# Patient Record
Sex: FEMALE | Race: WHITE | NOT HISPANIC OR LATINO | Employment: OTHER | URBAN - METROPOLITAN AREA
[De-identification: names, ages, dates, MRNs, and addresses within clinical notes are randomized per-mention and may not be internally consistent; named-entity substitution may affect disease eponyms.]

---

## 2018-07-03 ENCOUNTER — OFFICE VISIT (OUTPATIENT)
Dept: URGENT CARE | Facility: CLINIC | Age: 68
End: 2018-07-03
Payer: MEDICARE

## 2018-07-03 VITALS
OXYGEN SATURATION: 93 % | WEIGHT: 240 LBS | SYSTOLIC BLOOD PRESSURE: 120 MMHG | RESPIRATION RATE: 30 BRPM | HEART RATE: 104 BPM | HEIGHT: 66 IN | BODY MASS INDEX: 38.57 KG/M2 | DIASTOLIC BLOOD PRESSURE: 60 MMHG | TEMPERATURE: 99 F

## 2018-07-03 DIAGNOSIS — R09.02 HYPOXIA: ICD-10-CM

## 2018-07-03 DIAGNOSIS — R06.02 SHORTNESS OF BREATH: Primary | ICD-10-CM

## 2018-07-03 PROCEDURE — 94640 AIRWAY INHALATION TREATMENT: CPT | Mod: 59,S$GLB,, | Performed by: FAMILY MEDICINE

## 2018-07-03 PROCEDURE — 99214 OFFICE O/P EST MOD 30 MIN: CPT | Mod: 25,S$GLB,, | Performed by: FAMILY MEDICINE

## 2018-07-03 RX ORDER — ERGOCALCIFEROL 1.25 MG/1
50000 CAPSULE ORAL
COMMUNITY

## 2018-07-03 RX ORDER — DOCUSATE SODIUM 100 MG/1
100 CAPSULE, LIQUID FILLED ORAL 2 TIMES DAILY
COMMUNITY

## 2018-07-03 RX ORDER — LORATADINE 10 MG/1
10 TABLET ORAL DAILY
COMMUNITY

## 2018-07-03 RX ORDER — AMLODIPINE BESYLATE 10 MG/1
10 TABLET ORAL DAILY
COMMUNITY
End: 2019-09-24 | Stop reason: SDUPTHER

## 2018-07-03 RX ORDER — ACETAMINOPHEN 500 MG
500 TABLET ORAL EVERY 6 HOURS PRN
COMMUNITY

## 2018-07-03 RX ORDER — ALBUTEROL SULFATE 0.63 MG/3ML
0.63 SOLUTION RESPIRATORY (INHALATION) EVERY 6 HOURS PRN
Qty: 1 BOX | Refills: 0 | Status: SHIPPED | OUTPATIENT
Start: 2018-07-03 | End: 2022-01-01

## 2018-07-03 RX ORDER — ALBUTEROL SULFATE 0.83 MG/ML
2.5 SOLUTION RESPIRATORY (INHALATION)
Status: COMPLETED | OUTPATIENT
Start: 2018-07-03 | End: 2018-07-03

## 2018-07-03 RX ORDER — ANASTROZOLE 1 MG/1
1 TABLET ORAL DAILY
COMMUNITY

## 2018-07-03 RX ORDER — CALCIUM CARBONATE 500(1250)
1 TABLET ORAL DAILY
COMMUNITY
End: 2022-01-01

## 2018-07-03 RX ORDER — IPRATROPIUM BROMIDE 0.5 MG/2.5ML
0.5 SOLUTION RESPIRATORY (INHALATION)
Status: COMPLETED | OUTPATIENT
Start: 2018-07-03 | End: 2018-07-03

## 2018-07-03 RX ORDER — LISINOPRIL 40 MG/1
40 TABLET ORAL DAILY
COMMUNITY
End: 2020-03-20 | Stop reason: SDUPTHER

## 2018-07-03 RX ADMIN — IPRATROPIUM BROMIDE 0.5 MG: 0.5 SOLUTION RESPIRATORY (INHALATION) at 02:07

## 2018-07-03 RX ADMIN — ALBUTEROL SULFATE 2.5 MG: 0.83 SOLUTION RESPIRATORY (INHALATION) at 02:07

## 2018-07-03 NOTE — PATIENT INSTRUCTIONS

## 2018-07-03 NOTE — PROGRESS NOTES
"Subjective:       Patient ID: Stephanie Gonzalez is a 68 y.o. female.    Vitals:  height is 5' 6" (1.676 m) and weight is 108.9 kg (240 lb). Her oral temperature is 98.6 °F (37 °C). Her blood pressure is 120/60 and her pulse is 104. Her respiration is 30 (abnormal) and oxygen saturation is 93% (abnormal).     Chief Complaint: Cough    Hx of tobacco abuse and diagnosis of COPD per patient. Reports 20 pk/yr history. Recently seen by PCP and diagnosed with sinusitis      Cough   This is a new problem. The current episode started yesterday. The problem has been gradually improving. The problem occurs every few hours. The cough is productive of sputum. Associated symptoms include a fever, shortness of breath and wheezing. Pertinent negatives include no chest pain, chills, ear pain, eye redness, headaches, myalgias or sore throat. She has tried OTC cough suppressant for the symptoms.     Review of Systems   Constitution: Positive for fever. Negative for chills and malaise/fatigue.   HENT: Negative for congestion, ear pain, hoarse voice and sore throat.    Eyes: Negative for discharge and redness.   Cardiovascular: Negative for chest pain, dyspnea on exertion and leg swelling.   Respiratory: Positive for cough, shortness of breath, sputum production and wheezing.    Musculoskeletal: Negative for myalgias.   Gastrointestinal: Negative for abdominal pain and nausea.   Neurological: Negative for headaches.       Objective:      Physical Exam   Constitutional: She appears well-developed and well-nourished. She appears distressed.   HENT:   Head: Normocephalic and atraumatic.   Eyes: EOM are normal. Pupils are equal, round, and reactive to light.   Neck: Normal range of motion. Neck supple.   Cardiovascular: Normal rate and regular rhythm.    Pulmonary/Chest: She is in respiratory distress. She has wheezes (diffuse with poor airway movement).   Skin: There is cyanosis.   Nursing note and vitals reviewed.      Assessment:     "   1. Shortness of breath    2. Hypoxia        Plan:         Shortness of breath  -     X-Ray Chest PA And Lateral; Future; Expected date: 07/03/2018  -     albuterol (ACCUNEB) 0.63 mg/3 mL Nebu; Take 3 mLs (0.63 mg total) by nebulization every 6 (six) hours as needed. Rescue  Dispense: 1 Box; Refill: 0  -     ipratropium 0.02 % nebulizer solution 0.5 mg; Take 2.5 mLs (0.5 mg total) by nebulization one time.  -     albuterol nebulizer solution 2.5 mg; Take 3 mLs (2.5 mg total) by nebulization one time.    Hypoxia      Referred patient to the ER for evaluation of hypoxia. Patient and  declined and stated that they preferred to contact their family doctor and follow his advise. Stressed with patient that prolonged hypoxia is not compatible with life and would recommend ambulance transfer. Patient and  verbalized understanding but nonetheless declines ER evaluation

## 2018-07-06 ENCOUNTER — TELEPHONE (OUTPATIENT)
Dept: URGENT CARE | Facility: CLINIC | Age: 68
End: 2018-07-06

## 2018-08-23 LAB
CHOL/HDLC RATIO: 3
CHOLEST SERPL-MSCNC: 197 MG/DL (ref 0–200)
HDLC SERPL-MCNC: 77 MG/DL
LDLC SERPL CALC-MCNC: 103 MG/DL
NON HDL CHOL (CALC): 120
TRIGLYCERIDE (LIPID PAN): 109

## 2019-09-24 ENCOUNTER — OFFICE VISIT (OUTPATIENT)
Dept: FAMILY MEDICINE | Facility: CLINIC | Age: 69
End: 2019-09-24
Payer: MEDICARE

## 2019-09-24 VITALS
BODY MASS INDEX: 39.27 KG/M2 | SYSTOLIC BLOOD PRESSURE: 124 MMHG | DIASTOLIC BLOOD PRESSURE: 78 MMHG | OXYGEN SATURATION: 94 % | HEIGHT: 65 IN | TEMPERATURE: 99 F | WEIGHT: 235.69 LBS | RESPIRATION RATE: 18 BRPM | HEART RATE: 95 BPM

## 2019-09-24 DIAGNOSIS — D45 POLYCYTHEMIA VERA: ICD-10-CM

## 2019-09-24 DIAGNOSIS — I10 BENIGN ESSENTIAL HYPERTENSION: Primary | ICD-10-CM

## 2019-09-24 DIAGNOSIS — J44.9 CHRONIC OBSTRUCTIVE PULMONARY DISEASE, UNSPECIFIED COPD TYPE: ICD-10-CM

## 2019-09-24 DIAGNOSIS — Z85.3 HISTORY OF BREAST CANCER: ICD-10-CM

## 2019-09-24 DIAGNOSIS — M81.0 AGE-RELATED OSTEOPOROSIS WITHOUT CURRENT PATHOLOGICAL FRACTURE: ICD-10-CM

## 2019-09-24 DIAGNOSIS — J30.1 SEASONAL ALLERGIC RHINITIS DUE TO POLLEN: ICD-10-CM

## 2019-09-24 PROCEDURE — 99999 PR PBB SHADOW E&M-EST. PATIENT-LVL III: ICD-10-PCS | Mod: PBBFAC,HCNC,, | Performed by: INTERNAL MEDICINE

## 2019-09-24 PROCEDURE — 99499 UNLISTED E&M SERVICE: CPT | Mod: HCNC,S$GLB,, | Performed by: INTERNAL MEDICINE

## 2019-09-24 PROCEDURE — 99214 PR OFFICE/OUTPT VISIT, EST, LEVL IV, 30-39 MIN: ICD-10-PCS | Mod: HCNC,S$GLB,, | Performed by: INTERNAL MEDICINE

## 2019-09-24 PROCEDURE — 99214 OFFICE O/P EST MOD 30 MIN: CPT | Mod: HCNC,S$GLB,, | Performed by: INTERNAL MEDICINE

## 2019-09-24 PROCEDURE — 1101F PR PT FALLS ASSESS DOC 0-1 FALLS W/OUT INJ PAST YR: ICD-10-PCS | Mod: HCNC,CPTII,S$GLB, | Performed by: INTERNAL MEDICINE

## 2019-09-24 PROCEDURE — 99499 RISK ADDL DX/OHS AUDIT: ICD-10-PCS | Mod: HCNC,S$GLB,, | Performed by: INTERNAL MEDICINE

## 2019-09-24 PROCEDURE — 99999 PR PBB SHADOW E&M-EST. PATIENT-LVL III: CPT | Mod: PBBFAC,HCNC,, | Performed by: INTERNAL MEDICINE

## 2019-09-24 PROCEDURE — 1101F PT FALLS ASSESS-DOCD LE1/YR: CPT | Mod: HCNC,CPTII,S$GLB, | Performed by: INTERNAL MEDICINE

## 2019-09-24 RX ORDER — FLUTICASONE PROPIONATE AND SALMETEROL 250; 50 UG/1; UG/1
1 POWDER RESPIRATORY (INHALATION) 2 TIMES DAILY
Qty: 60 EACH | Refills: 11 | Status: SHIPPED | OUTPATIENT
Start: 2019-09-24 | End: 2020-09-28 | Stop reason: SDUPTHER

## 2019-09-24 RX ORDER — AMLODIPINE BESYLATE 10 MG/1
10 TABLET ORAL DAILY
Qty: 90 TABLET | Refills: 3 | Status: SHIPPED | OUTPATIENT
Start: 2019-09-24 | End: 2019-10-23 | Stop reason: SDUPTHER

## 2019-09-24 NOTE — PROGRESS NOTES
[facility] Note    Chief Complaint      Chief Complaint   Patient presents with    Follow-up     6 month follow up        History of Present Illness      Stephanie Gonzalez is a 69 y.o. female who presents today for   Chief Complaint   Patient presents with    Follow-up     6 month follow up    .  Patient comes to appointment for 6m f/u for chronic issues as discussed in detail below . She is due for full labs with this visit . Is seeing dr [facility] onc currently . Will need full labs with this visit . Wyatt thurston is uptodate with her mammogram for surveillance . She will be getting her flu vaccine next month with her  .     Problem List Items Addressed This Visit        Pulmonary    Chronic obstructive pulmonary disease    Overview     She is complaining of some sob and wheezing she needs to be placed on maint meds will start advair 250/50 bid she will call back with update             Cardiac/Vascular    Benign essential hypertension - Primary    Overview     bp well controlled on current regimen cont same             Oncology    Polycythemia vera    Overview     Dr lugo managing cont same          History of breast cancer    Overview     See above            Orthopedic    Age-related osteoporosis without current pathological fracture    Overview     cont prolia            Other    Seasonal allergic rhinitis due to pollen    Overview     stable on claritin cont same                   Past Medical History:  Past Medical History:   Diagnosis Date    Cancer 2014    Breast    Hypertension        Past Surgical History:  Past Surgical History:   Procedure Laterality Date    BREAST SURGERY      ELBOW SURGERY         Family History:  family history includes Diabetes in her father; Heart disease in her brother, brother, and father; Stroke in her mother.    Social History:  Social History     Socioeconomic History    Marital status:      Spouse name: Not on file    Number of  children: Not on file    Years of education: Not on file    Highest education level: Not on file   Occupational History    Not on file   Social Needs    Financial resource strain: Not on file    Food insecurity:     Worry: Not on file     Inability: Not on file    Transportation needs:     Medical: Not on file     Non-medical: Not on file   Tobacco Use    Smoking status: Former Smoker     Last attempt to quit: 7/3/2008     Years since quittin.2    Smokeless tobacco: Never Used   Substance and Sexual Activity    Alcohol use: Not on file    Drug use: Not on file    Sexual activity: Not on file   Lifestyle    Physical activity:     Days per week: Not on file     Minutes per session: Not on file    Stress: Not on file   Relationships    Social connections:     Talks on phone: Not on file     Gets together: Not on file     Attends [employer] service: Not on file     Active member of club or organization: Not on file     Attends meetings of clubs or organizations: Not on file     Relationship status: Not on file   Other Topics Concern    Not on file   Social History Narrative    Not on file       Review of Systems:   Review of Systems   Constitutional: Negative for fever and weight loss.   HENT: Negative for congestion, hearing loss and sore throat.    Eyes: Negative for blurred vision.   Respiratory: Positive for shortness of breath and wheezing. Negative for cough.    Cardiovascular: Negative for chest pain, palpitations, claudication and leg swelling.   Gastrointestinal: Negative for abdominal pain, constipation and heartburn.   Genitourinary: Negative for dysuria.   Musculoskeletal: Negative for back pain and myalgias.   Skin: Negative for rash.   Neurological: Negative for focal weakness and headaches.   Endo/Heme/Allergies: Does not bruise/bleed easily.   Psychiatric/Behavioral: Negative for depression and suicidal ideas. The patient is not nervous/anxious.          Medications:  Outpatient  Encounter Medications as of 9/24/2019   Medication Sig Dispense Refill    acetaminophen (TYLENOL) 500 MG tablet Take 500 mg by mouth every 6 (six) hours as needed for Pain.      amLODIPine (NORVASC) 10 MG tablet Take 10 mg by mouth once daily.      anastrozole (ARIMIDEX) 1 mg Tab Take 1 mg by mouth once daily.      calcium carbonate (OS-RAMÍREZ) 500 mg calcium (1,250 mg) tablet Take 1 tablet by mouth once daily.      docusate sodium (COLACE) 100 MG capsule Take 100 mg by mouth 2 (two) times daily.      ergocalciferol (VITAMIN D2) 50,000 unit Cap Take 50,000 Units by mouth every 7 days.      lisinopril (PRINIVIL,ZESTRIL) 40 MG tablet Take 40 mg by mouth once daily.      loratadine (CLARITIN) 10 mg tablet Take 10 mg by mouth once daily.      albuterol (ACCUNEB) 0.63 mg/3 mL Nebu Take 3 mLs (0.63 mg total) by nebulization every 6 (six) hours as needed. Rescue 1 Box 0    fluticasone-salmeterol diskus inhaler 250-50 mcg Inhale 1 puff into the lungs 2 (two) times daily. Controller 60 each 11     No facility-administered encounter medications on file as of 9/24/2019.         Allergies:  Review of patient's allergies indicates:  No Known Allergies      Physical Exam      Vitals:    09/24/19 1411   BP: 124/78   Pulse: 95   Resp: 18   Temp: 98.7 °F (37.1 °C)      Body mass index is 39.83 kg/m².    Physical Exam   Constitutional: She is oriented to person, place, and time. She appears well-developed and well-nourished.   HENT:   Mouth/Throat: Oropharynx is clear and moist.   Eyes: Pupils are equal, round, and reactive to light. EOM are normal.   Neck: Normal range of motion. No thyromegaly present.   Cardiovascular: Normal rate and normal heart sounds. Exam reveals no gallop and no friction rub.   No murmur heard.  Pulmonary/Chest: She has wheezes.   Abdominal: Soft. Bowel sounds are normal.   Musculoskeletal: Normal range of motion.   Lymphadenopathy:     She has no cervical adenopathy.   Neurological: She is alert and  oriented to person, place, and time. No cranial nerve deficit.   Skin: Skin is warm. No rash noted.   Psychiatric: She has a normal mood and affect. Her behavior is normal.        Laboratory:  CBC:  No results for input(s): WBC, RBC, HGB, HCT, PLT, MCV, MCH, MCHC in the last 2160 hours.  CMP:  No results for input(s): GLU, CALCIUM, ALBUMIN, PROT, NA, K, CO2, CL, BUN, ALKPHOS, ALT, AST, BILITOT in the last 2160 hours.    Invalid input(s): CREATININ  URINALYSIS:  No results for input(s): COLORU, CLARITYU, SPECGRAV, PHUR, PROTEINUA, GLUCOSEU, BILIRUBINCON, BLOODU, WBCU, RBCU, BACTERIA, MUCUS, NITRITE, LEUKOCYTESUR, UROBILINOGEN, HYALINECASTS in the last 2160 hours.   LIPIDS:  No results for input(s): TSH, HDL, CHOL, TRIG, LDLCALC, CHOLHDL, NONHDLCHOL, TOTALCHOLEST in the last 2160 hours.  TSH:  No results for input(s): TSH in the last 2160 hours.  A1C:  No results for input(s): HGBA1C in the last 2160 hours.    Radiology:        Assessment:     Stephanie Gonzalez is a 69 y.o.female with:    Benign essential hypertension  -     Comprehensive metabolic panel; Future; Expected date: 09/24/2019    History of breast cancer    Seasonal allergic rhinitis due to pollen    Chronic obstructive pulmonary disease, unspecified COPD type  -     fluticasone-salmeterol diskus inhaler 250-50 mcg; Inhale 1 puff into the lungs 2 (two) times daily. Controller  Dispense: 60 each; Refill: 11    Polycythemia vera  -     CBC auto differential; Future; Expected date: 09/24/2019    Age-related osteoporosis without current pathological fracture          Plan:     As above, continue current medications and maintain follow up with specialists.  Return to clinic in 6 months.    Frederick W Dantagnan [facility] [place]

## 2019-09-24 NOTE — PROGRESS NOTES
Patient, Stephanie Gonzalez (MRN #[id number]), presented with a recorded BMI of 39.83 kg/m^2 and a documented comorbidity(s):  - Hypertension  to which the severe obesity is a contributing factor. This is consistent with the definition of severe obesity (BMI 35.0-39.9) with comorbidity (ICD-10 E66.01, Z68.35). The patient's severe obesity was monitored, evaluated, addressed and/or treated. This addendum to the medical record is made on 09/24/2019.

## 2019-10-02 LAB
ALBUMIN: 4.2 GRAM/DL (ref 3.5–5)
ALP SERPL-CCNC: 79 UNIT/L (ref 38–126)
ALT SERPL W P-5'-P-CCNC: 11 UNIT/L (ref 7–56)
ANION GAP SERPL CALC-SCNC: 19 MEQ/L (ref 9–18)
AST SERPL-CCNC: 14 UNIT/L (ref 7–40)
BASOPHILS ABSOLUTE COUNT: 0 K/UL (ref 0–0.2)
BASOPHILS NFR BLD: 0.7 % (ref 0–2)
BILIRUB SERPL-MCNC: 0.8 MG/DL (ref 0–1.2)
BUN BLD-MCNC: 9 MG/DL (ref 7–21)
BUN/CREAT SERPL: 15 RATIO (ref 6–22)
CALC OSMOLALITY: 284 MOSM/KG (ref 275–295)
CALCIUM SERPL-MCNC: 9.3 MG/DL (ref 8.5–10.3)
CHLORIDE SERPL-SCNC: 99 MEQ/L (ref 98–107)
CO2 SERPL-SCNC: 29 MEQ/L (ref 21–31)
CREAT SERPL-MCNC: 0.6 MG/DL (ref 0.5–1)
DIFFERENTIAL TYPE: ABNORMAL
EOSINOPHIL NFR BLD: 2.8 % (ref 0–4)
EOSINOPHILS ABSOLUTE COUNT: 0.1 K/UL (ref 0–0.7)
ERYTHROCYTE [DISTWIDTH] IN BLOOD BY AUTOMATED COUNT: 13.8 GRAM/DL (ref 12–15.3)
GFR: 102.5 ML/MIN/1.73M2
GLUCOSE SERPL-MCNC: 97 MG/DL (ref 70–100)
HCT VFR BLD AUTO: 51.7 % (ref 37–47)
HGB BLD-MCNC: 17.6 GRAM/DL (ref 12–16)
LYMPHOCYTES %: 19.3 % (ref 15–45)
LYMPHOCYTES ABSOLUTE COUNT: 1 K/UL (ref 1–4.2)
MCH RBC QN AUTO: 35 PICOGRAM (ref 27–33)
MCHC RBC AUTO-ENTMCNC: 34.1 GRAM/DL (ref 32–36)
MCV RBC AUTO: 102.6 FEMTOLITER (ref 81–99)
MONOCYTES %: 10.7 % (ref 3–13)
MONOCYTES ABSOLUTE COUNT: 0.6 K/UL (ref 0.1–0.8)
NEUTROPHILS ABSOLUTE COUNT: 3.5 K/UL (ref 2.1–7.6)
NEUTROPHILS RELATIVE PERCENT: 66.5 % (ref 32–80)
PLATELET # BLD AUTO: 212 K/UL (ref 150–350)
PMV BLD AUTO: 8.8 FEMTOLITER (ref 7–10.2)
POTASSIUM SERPL-SCNC: 4.2 MEQ/L (ref 3.5–5)
RBC # BLD AUTO: 5.04 MIL/UL (ref 4.2–5.4)
SODIUM BLD-SCNC: 143 MEQ/L (ref 135–145)
TOTAL PROTEIN: 6.5 GRAM/DL (ref 6.3–8.2)
WBC # BLD AUTO: 5.3 K/UL (ref 4.5–11)

## 2019-10-03 ENCOUNTER — PATIENT MESSAGE (OUTPATIENT)
Dept: FAMILY MEDICINE | Facility: CLINIC | Age: 69
End: 2019-10-03

## 2019-10-23 DIAGNOSIS — I10 BENIGN ESSENTIAL HYPERTENSION: ICD-10-CM

## 2019-10-24 RX ORDER — AMLODIPINE BESYLATE 10 MG/1
10 TABLET ORAL DAILY
Qty: 90 TABLET | Refills: 3 | Status: SHIPPED | OUTPATIENT
Start: 2019-10-24 | End: 2020-09-29 | Stop reason: SDUPTHER

## 2020-03-12 ENCOUNTER — TELEPHONE (OUTPATIENT)
Dept: ADMINISTRATIVE | Facility: HOSPITAL | Age: 70
End: 2020-03-12

## 2020-03-20 RX ORDER — LISINOPRIL 40 MG/1
40 TABLET ORAL DAILY
Qty: 90 TABLET | Refills: 2 | Status: SHIPPED | OUTPATIENT
Start: 2020-03-20 | End: 2020-09-29 | Stop reason: SDUPTHER

## 2020-03-26 ENCOUNTER — PATIENT MESSAGE (OUTPATIENT)
Dept: FAMILY MEDICINE | Facility: CLINIC | Age: 70
End: 2020-03-26

## 2020-03-26 ENCOUNTER — OFFICE VISIT (OUTPATIENT)
Dept: FAMILY MEDICINE | Facility: CLINIC | Age: 70
End: 2020-03-26
Payer: MEDICARE

## 2020-03-26 DIAGNOSIS — I10 BENIGN ESSENTIAL HYPERTENSION: Primary | ICD-10-CM

## 2020-03-26 DIAGNOSIS — J30.1 SEASONAL ALLERGIC RHINITIS DUE TO POLLEN: ICD-10-CM

## 2020-03-26 DIAGNOSIS — D45 POLYCYTHEMIA VERA: ICD-10-CM

## 2020-03-26 DIAGNOSIS — Z85.3 HISTORY OF BREAST CANCER: ICD-10-CM

## 2020-03-26 DIAGNOSIS — M81.0 AGE-RELATED OSTEOPOROSIS WITHOUT CURRENT PATHOLOGICAL FRACTURE: ICD-10-CM

## 2020-03-26 DIAGNOSIS — J43.9 PULMONARY EMPHYSEMA, UNSPECIFIED EMPHYSEMA TYPE: ICD-10-CM

## 2020-03-26 PROCEDURE — 1101F PT FALLS ASSESS-DOCD LE1/YR: CPT | Mod: CPTII,95,, | Performed by: INTERNAL MEDICINE

## 2020-03-26 PROCEDURE — 99214 PR OFFICE/OUTPT VISIT, EST, LEVL IV, 30-39 MIN: ICD-10-PCS | Mod: 95,,, | Performed by: INTERNAL MEDICINE

## 2020-03-26 PROCEDURE — 99214 OFFICE O/P EST MOD 30 MIN: CPT | Mod: 95,,, | Performed by: INTERNAL MEDICINE

## 2020-03-26 PROCEDURE — 1101F PR PT FALLS ASSESS DOC 0-1 FALLS W/OUT INJ PAST YR: ICD-10-PCS | Mod: CPTII,95,, | Performed by: INTERNAL MEDICINE

## 2020-03-26 PROCEDURE — 99499 RISK ADDL DX/OHS AUDIT: ICD-10-PCS | Mod: 95,,, | Performed by: INTERNAL MEDICINE

## 2020-03-26 PROCEDURE — 1159F MED LIST DOCD IN RCRD: CPT | Mod: 95,,, | Performed by: INTERNAL MEDICINE

## 2020-03-26 PROCEDURE — 1159F PR MEDICATION LIST DOCUMENTED IN MEDICAL RECORD: ICD-10-PCS | Mod: 95,,, | Performed by: INTERNAL MEDICINE

## 2020-03-26 PROCEDURE — 99499 UNLISTED E&M SERVICE: CPT | Mod: 95,,, | Performed by: INTERNAL MEDICINE

## 2020-03-26 NOTE — PROGRESS NOTES
The patient location is: home   The chief complaint leading to consultation is: 6 m checkup for chronic issues and for refills .   Visit type: Virtual visit with synchronous audio and video  Total time spent with patient: 16 min  Each patient to whom he or she provides medical services by telemedicine is:  (1) informed of the relationship between the physician and patient and the respective role of any other health care provider with respect to management of the patient; and (2) notified that he or she may decline to receive medical services by telemedicine and may withdraw from such care at any time.      [facility] Note    Chief Complaint    6m checkup for my chronic issues and refills     History of Present Illness      Stephanie Gonzalez is a 70 y.o. female who presents today for No chief complaint on file.  .  Patient comes to appointment here for 6 m checkup for chronic issues as discussed in detail below . She is feeling well is complaint with all meds and  specialist f/u as below . She is uptodate with mammogram is considering cologuard has not had colonoscopy .     Problem List Items Addressed This Visit        Pulmonary    Chronic obstructive pulmonary disease    Overview     Is doing great wth advair 250/50 has rarely needed rescue cont current regimen             Cardiac/Vascular    Benign essential hypertension - Primary    Overview     bp well controlled on current regimen bp done march 4th 127/70 . Cont same regimen working well             Oncology    Polycythemia vera    Overview     Dr lugo managing cont same          History of breast cancer    Overview     Dr enriquez managing now is doing well on arimedex             Orthopedic    Age-related osteoporosis without current pathological fracture    Overview     cont prolia stable             Other    Seasonal allergic rhinitis due to pollen    Overview     stable on claritin cont same                   Past Medical  History:  Past Medical History:   Diagnosis Date    Cancer 2014    Breast    Hypertension        Past Surgical History:  Past Surgical History:   Procedure Laterality Date    BREAST SURGERY      ELBOW SURGERY         Family History:  family history includes Diabetes in her father; Heart disease in her brother, brother, and father; Stroke in her mother.    Social History:  Social History     Socioeconomic History    Marital status:      Spouse name: Not on file    Number of children: Not on file    Years of education: Not on file    Highest education level: Not on file   Occupational History    Not on file   Social Needs    Financial resource strain: Not hard at all    Food insecurity:     Worry: Never true     Inability: Never true    Transportation needs:     Medical: No     Non-medical: No   Tobacco Use    Smoking status: Former Smoker     Last attempt to quit: 7/3/2008     Years since quittin.7    Smokeless tobacco: Never Used   Substance and Sexual Activity    Alcohol use: Not on file    Drug use: Not on file    Sexual activity: Not on file   Lifestyle    Physical activity:     Days per week: Not on file     Minutes per session: Not on file    Stress: Not at all   Relationships    Social connections:     Talks on phone: More than three times a week     Gets together: Twice a week     Attends Nondenominational service: Not on file     Active member of club or organization: No     Attends meetings of clubs or organizations: Never     Relationship status:    Other Topics Concern    Not on file   Social History Narrative    Not on file       Review of Systems:   Review of Systems   HENT: Negative for hearing loss.    Eyes: Negative for discharge.   Respiratory: Negative for wheezing.    Cardiovascular: Negative for chest pain and palpitations.   Gastrointestinal: Negative for blood in stool, constipation, diarrhea and vomiting.   Genitourinary: Negative for dysuria and hematuria.    Musculoskeletal: Negative for neck pain.   Neurological: Negative for weakness and headaches.   Endo/Heme/Allergies: Negative for polydipsia.         Medications:  Outpatient Encounter Medications as of 3/26/2020   Medication Sig Dispense Refill    acetaminophen (TYLENOL) 500 MG tablet Take 500 mg by mouth every 6 (six) hours as needed for Pain.      albuterol (ACCUNEB) 0.63 mg/3 mL Nebu Take 3 mLs (0.63 mg total) by nebulization every 6 (six) hours as needed. Rescue 1 Box 0    amLODIPine (NORVASC) 10 MG tablet Take 1 tablet (10 mg total) by mouth once daily. 90 tablet 3    anastrozole (ARIMIDEX) 1 mg Tab Take 1 mg by mouth once daily.      calcium carbonate (OS-RAMÍREZ) 500 mg calcium (1,250 mg) tablet Take 1 tablet by mouth once daily.      docusate sodium (COLACE) 100 MG capsule Take 100 mg by mouth 2 (two) times daily.      ergocalciferol (VITAMIN D2) 50,000 unit Cap Take 50,000 Units by mouth every 7 days.      fluticasone-salmeterol diskus inhaler 250-50 mcg Inhale 1 puff into the lungs 2 (two) times daily. Controller 60 each 11    lisinopriL (PRINIVIL,ZESTRIL) 40 MG tablet Take 1 tablet (40 mg total) by mouth once daily. 90 tablet 2    loratadine (CLARITIN) 10 mg tablet Take 10 mg by mouth once daily.       No facility-administered encounter medications on file as of 3/26/2020.         Allergies:  Review of patient's allergies indicates:  No Known Allergies      Physical Exam      There were no vitals filed for this visit.   There is no height or weight on file to calculate BMI.    Physical Exam   Constitutional: She is oriented to person, place, and time. She appears well-developed and well-nourished. No distress.   Eyes: Pupils are equal, round, and reactive to light. EOM are normal. No scleral icterus.   Pulmonary/Chest: Effort normal.   Neurological: She is alert and oriented to person, place, and time.   Skin: She is not diaphoretic.   Psychiatric: She has a normal mood and affect. Her behavior  is normal. Judgment and thought content normal.        Laboratory:  CBC:  No results for input(s): WBC, RBC, HGB, HCT, PLT, MCV, MCH, MCHC in the last 2160 hours.  CMP:  No results for input(s): GLU, CALCIUM, ALBUMIN, PROT, NA, K, CO2, CL, BUN, ALKPHOS, ALT, AST, BILITOT in the last 2160 hours.    Invalid input(s): CREATININ  URINALYSIS:  No results for input(s): COLORU, CLARITYU, SPECGRAV, PHUR, PROTEINUA, GLUCOSEU, BILIRUBINCON, BLOODU, WBCU, RBCU, BACTERIA, MUCUS, NITRITE, LEUKOCYTESUR, UROBILINOGEN, HYALINECASTS in the last 2160 hours.   LIPIDS:  No results for input(s): TSH, HDL, CHOL, TRIG, LDLCALC, CHOLHDL, NONHDLCHOL, TOTALCHOLEST in the last 2160 hours.  TSH:  No results for input(s): TSH in the last 2160 hours.  A1C:  No results for input(s): HGBA1C in the last 2160 hours.    Radiology:        Assessment:     Stephanie Gonzalez is a 70 y.o.female with:    Benign essential hypertension    Seasonal allergic rhinitis due to pollen    Pulmonary emphysema, unspecified emphysema type    Polycythemia vera    History of breast cancer    Age-related osteoporosis without current pathological fracture          Plan:     Problem List Items Addressed This Visit        Pulmonary    Chronic obstructive pulmonary disease    Overview     Is doing great wth advair 250/50 has rarely needed rescue cont current regimen             Cardiac/Vascular    Benign essential hypertension - Primary    Overview     bp well controlled on current regimen bp done march 4th 127/70 . Cont same regimen working well             Oncology    Polycythemia vera    Overview     Dr lugo managing cont same          History of breast cancer    Overview     Dr enriquez managing now is doing well on arimedex             Orthopedic    Age-related osteoporosis without current pathological fracture    Overview     cont prolia stable             Other    Seasonal allergic rhinitis due to pollen    Overview     stable on claritin cont same                  As above, continue current medications and maintain follow up with specialists.  Return to clinic in 6  months.    Frederick W Dantagnan [facility] [place]                Answers for HPI/ROS submitted by the patient on 3/24/2020   activity change: No  unexpected weight change: No  rhinorrhea: No  trouble swallowing: No  visual disturbance: No  chest tightness: No  polyuria: No  difficulty urinating: No  menstrual problem: No  joint swelling: No  arthralgias: No  confusion: No  dysphoric mood: No

## 2020-05-08 DIAGNOSIS — Z12.11 COLON CANCER SCREENING: ICD-10-CM

## 2020-06-26 ENCOUNTER — OFFICE VISIT (OUTPATIENT)
Dept: URGENT CARE | Facility: CLINIC | Age: 70
End: 2020-06-26
Payer: MEDICARE

## 2020-06-26 ENCOUNTER — TELEPHONE (OUTPATIENT)
Dept: WOUND CARE | Facility: CLINIC | Age: 70
End: 2020-06-26

## 2020-06-26 VITALS
WEIGHT: 235 LBS | RESPIRATION RATE: 20 BRPM | BODY MASS INDEX: 37.77 KG/M2 | HEART RATE: 120 BPM | HEIGHT: 66 IN | SYSTOLIC BLOOD PRESSURE: 152 MMHG | DIASTOLIC BLOOD PRESSURE: 75 MMHG | TEMPERATURE: 98 F | OXYGEN SATURATION: 94 %

## 2020-06-26 DIAGNOSIS — S61.412A LACERATION OF LEFT HAND WITHOUT FOREIGN BODY, INITIAL ENCOUNTER: Primary | ICD-10-CM

## 2020-06-26 PROCEDURE — 99214 OFFICE O/P EST MOD 30 MIN: CPT | Mod: 25,S$GLB,, | Performed by: FAMILY MEDICINE

## 2020-06-26 PROCEDURE — 99214 PR OFFICE/OUTPT VISIT, EST, LEVL IV, 30-39 MIN: ICD-10-PCS | Mod: 25,S$GLB,, | Performed by: FAMILY MEDICINE

## 2020-06-26 PROCEDURE — 90714 TD VACC NO PRESV 7 YRS+ IM: CPT | Mod: S$GLB,,, | Performed by: FAMILY MEDICINE

## 2020-06-26 PROCEDURE — 90714 TD VACCINE GREATER THAN OR EQUAL TO 7YO WITH PRESERVATIVE IM: ICD-10-PCS | Mod: S$GLB,,, | Performed by: FAMILY MEDICINE

## 2020-06-26 PROCEDURE — 12042 LACERATION REPAIR: ICD-10-PCS | Mod: S$GLB,,, | Performed by: FAMILY MEDICINE

## 2020-06-26 PROCEDURE — 90471 TD VACCINE GREATER THAN OR EQUAL TO 7YO WITH PRESERVATIVE IM: ICD-10-PCS | Mod: S$GLB,,, | Performed by: FAMILY MEDICINE

## 2020-06-26 PROCEDURE — 90471 IMMUNIZATION ADMIN: CPT | Mod: S$GLB,,, | Performed by: FAMILY MEDICINE

## 2020-06-26 PROCEDURE — 12042 INTMD RPR N-HF/GENIT2.6-7.5: CPT | Mod: S$GLB,,, | Performed by: FAMILY MEDICINE

## 2020-06-26 RX ORDER — CEPHALEXIN 500 MG/1
500 CAPSULE ORAL 4 TIMES DAILY
Qty: 40 CAPSULE | Refills: 0 | Status: SHIPPED | OUTPATIENT
Start: 2020-06-26 | End: 2020-06-26 | Stop reason: ALTCHOICE

## 2020-06-26 RX ORDER — CEFUROXIME AXETIL 250 MG/1
TABLET ORAL
COMMUNITY
End: 2021-03-30

## 2020-06-26 RX ORDER — MUPIROCIN 20 MG/G
OINTMENT TOPICAL
Qty: 22 G | Refills: 1 | Status: SHIPPED | OUTPATIENT
Start: 2020-06-26 | End: 2020-07-22 | Stop reason: SDUPTHER

## 2020-06-26 RX ORDER — PREDNISONE 10 MG/1
TABLET ORAL
COMMUNITY
End: 2021-03-30

## 2020-06-26 RX ORDER — AMOXICILLIN AND CLAVULANATE POTASSIUM 875; 125 MG/1; MG/1
1 TABLET, FILM COATED ORAL 2 TIMES DAILY
Qty: 20 TABLET | Refills: 0 | Status: SHIPPED | OUTPATIENT
Start: 2020-06-26 | End: 2020-07-06

## 2020-06-26 RX ORDER — FUROSEMIDE 20 MG/1
TABLET ORAL
COMMUNITY
End: 2021-03-30 | Stop reason: SDUPTHER

## 2020-06-26 NOTE — TELEPHONE ENCOUNTER
----- Message from Tom Mera sent at 6/26/2020  4:33 PM CDT -----  Pt is being referred to Wound care for an urgent care follow'up. They have a referral into epic for review. Please contact for scheduling,thanks

## 2020-06-26 NOTE — TELEPHONE ENCOUNTER
Called patient at [phone] - no answer, left voice message asking patient to return call at [phone].  Also, send message to Melanie Vu asking if she had time to see this patient since Kamille is not in clinic next week. Will follow up when I return to clinic on Tuesday, 6/30/2020

## 2020-06-26 NOTE — PATIENT INSTRUCTIONS
Hand Laceration: All Closures  A laceration is a cut through the skin. You have a cut on the hand. Deep cuts usually require stitches (sutures) or staples. Minor cuts may be closed with surgical tape or skin adhesive.   X-rays may be done if something may have entered the skin through the cut. Your may also be given a tetanus shot. This may be given if you are not updated on this vaccination and the object that cut you may carry tetanus.    Home care  · Your healthcare provider may prescribe an antibiotic. This is to help prevent infection. Follow all instructions for taking this medicine. Take the medicine every day until it is gone or you are told to stop. You should not have any left over.  · The healthcare provider may prescribe medicines for pain. Follow instructions for taking them.  · Follow the healthcare providers instructions on how to care for the cut.  · Keep the wound clean and dry. Do not get the wound wet until you are told it is okay to do so. If the bandage gets wet, remove it. Gently pat the wound dry with a clean cloth. Then put on a clean, dry bandage..  · To help prevent infection, wash your hands with soap and water before and after caring for the wound.   · Caring for sutures or staples: Once you no longer need to keep them dry, clean the wound daily. First, remove the bandage. Then wash the area gently with soap and warm water, or as directed by the health care provider. Use a wet cotton swab to loosen and remove any blood or crust that forms. After cleaning, apply a thin layer of antibiotic ointment if advised. Then put on a new bandage unless you are told not to.  · Caring for skin glue: Dont put apply liquid, ointment, or cream on the wound while the glue is in place. Avoid activities that cause heavy sweating. Protect the wound from sunlight. Do not scratch, rub, or pick at the adhesive film. Do not place tape directly over the film. The glue should peel off within 5 to 10  days.   · Caring for surgical tape: Keep the area dry. If it gets wet, blot it dry with a clean towel. Surgical tape usually falls off within 7 to 10 days. If it has not fallen off after 10 days, you can take it off yourself. Put mineral oil or petroleum jelly on a cotton ball and gently rub the tape until it is removed.  · Once you can get the wound wet, you may shower as usual but do not soak the wound in water (no tub baths or swimming)  · Even with proper treatment, a wound infection may sometimes occur. Check the wound daily for signs of infection listed below.  Follow-up care  Follow up with your healthcare provider as advised. If you have stitches or staples, be sure to return as directed to have them removed.  When to seek medical advice  Call your healthcare provider right away if any of these occur:  · Wound bleeding not controlled by direct pressure  · Signs of infection, including increasing pain in the wound, increasing wound redness or swelling, or pus or bad odor coming from the wound  · Fever of 100.4°F (38.ºC) or as directed by your health care provider  · Stitches or staples come apart or fall out or surgical tape falls off before 7 days  · Wound edges re-open  · Wound changes colors  · Numbness or weakness in the affected hand   · Decreased movement of the hand  Date Last Reviewed: 6/10/2015  © 9331-9994 [employer]. [address] [place] [address], [place] [address]. All rights reserved. This information is not intended as a substitute for professional medical care. Always follow your healthcare professional's instructions.

## 2020-06-26 NOTE — PROGRESS NOTES
"Subjective:       Patient ID: Stephanie Gonzalez is a 70 y.o. female.    Vitals:  height is 5' 5.5" (1.664 m) and weight is 106.6 kg (235 lb). Her tympanic temperature is 97.9 °F (36.6 °C). Her blood pressure is 152/75 (abnormal) and her pulse is 120 (abnormal). Her respiration is 20 and oxygen saturation is 94% (abnormal).     Chief Complaint: Hand Injury    This is a 70 y.o. female   with Past Medical History:  2014: Cancer      Comment:  Breast  No date: Hypertension   and Past Surgical History:  No date: BREAST SURGERY  No date: ELBOW SURGERY  who presents today with a chief complaint of abrasion on both arms that happened today. She was walking when she fell and scraped both of her hands. She cleaned the wounds with water.   Arm Pain   The incident occurred 1 to 3 hours ago. The incident occurred at home. The pain is present in the right hand, left forearm and left hand. The pain does not radiate. The pain is at a severity of 4/10. The pain is moderate. The pain has been constant since the incident. Nothing aggravates the symptoms. She has tried nothing for the symptoms. The treatment provided no relief.       Constitution: Negative for fatigue.   HENT: Negative for facial swelling and facial trauma.    Neck: Negative for neck stiffness.   Cardiovascular: Negative for chest trauma.   Eyes: Negative for eye trauma, double vision and blurred vision.   Gastrointestinal: Negative for abdominal trauma, abdominal pain and rectal bleeding.   Genitourinary: Negative for hematuria, missed menses, genital trauma and pelvic pain.   Musculoskeletal: Negative for pain, trauma, joint swelling and abnormal ROM of joint.   Skin: Positive for abrasion and bruising. Negative for color change, wound and laceration.   Neurological: Negative for dizziness, history of vertigo, light-headedness, coordination disturbances, altered mental status and loss of consciousness.   Hematologic/Lymphatic: Negative for history of bleeding " disorder.   Psychiatric/Behavioral: Negative for altered mental status.       Objective:      Physical Exam   Cardiovascular: Normal rate and regular rhythm.   Abdominal: Normal appearance.   Neurological: She is alert.   Skin: Lesions:  lesion (2X6 cm supericial laceration/skin tear dorsum of left hand exposed tendon sheath. Full function of left hand. fragile skin noted)  Nursing note and vitals reviewed.        Assessment:       1. Laceration of left hand without foreign body, initial encounter      Wound left partially open due to inadequate tissue and fragile skin (unable to maintain suture without tearing of the skin). Detailed instruction on wound care and signs and symptoms of infection. Discussed early follow up with wound care.    Plan:         Laceration of left hand without foreign body, initial encounter  -     Ambulatory referral/consult to Wound Clinic  -     amoxicillin-clavulanate 875-125mg (AUGMENTIN) 875-125 mg per tablet; Take 1 tablet by mouth 2 (two) times daily. for 10 days  Dispense: 20 tablet; Refill: 0  -     mupirocin (BACTROBAN) 2 % ointment; Apply to affected area 3 times daily  Dispense: 22 g; Refill: 1  -     (In Office Administered) Td Vaccine  -     Discontinue: cephALEXin (KEFLEX) 500 MG capsule; Take 1 capsule (500 mg total) by mouth 4 (four) times daily. for 10 days  Dispense: 40 capsule; Refill: 0  -     mupirocin (BACTROBAN) 2 % ointment; Apply to affected area 3 times daily  Dispense: 22 g; Refill: 1  -     Laceration Repair

## 2020-06-27 NOTE — PROCEDURES
Laceration Repair    Date/Time: 6/26/2020 3:00 PM  Performed by: Jony Conner MD  Authorized by: Jony Conner MD   Consent Done: Yes  Consent: Verbal consent obtained.  Risks and benefits: risks, benefits and alternatives were discussed  Consent given by: patient  Patient understanding: patient states understanding of the procedure being performed  Patient consent: the patient's understanding of the procedure matches consent given  Procedure consent: procedure consent matches procedure scheduled  Required items: required blood products, implants, devices, and special equipment available  Patient identity confirmed: verbally with patient  Body area: upper extremity  Location details: left hand  Laceration length: 6 cm  Contamination: The wound is contaminated.  Foreign bodies: no foreign bodies  Tendon involvement: none  Nerve involvement: none  Vascular damage: no  Anesthesia: local infiltration    Anesthesia:  Local Anesthetic: lidocaine 1% with epinephrine  Anesthetic total: 5 mL  Preparation: Patient was prepped and draped in the usual sterile fashion.  Irrigation solution: saline  Irrigation method: syringe  Amount of cleaning: extensive  Debridement: none  Degree of undermining: none  Skin closure: 3-0 nylon  Number of sutures: 7  Technique: simple  Approximation: loose  Approximation difficulty: simple  Dressing: antibiotic ointment, dressing applied, non-stick sterile dressing and splint for protection  Patient tolerance: Patient tolerated the procedure well with no immediate complications  Comments: Wound left partially open due to inadequate tissue and fragile skin (unable to maintain suture without tearing of the skin). Detailed instruction on wound care and signs and symptoms of infection. Discussed early follow up with wound care.

## 2020-07-08 ENCOUNTER — HOSPITAL ENCOUNTER (OUTPATIENT)
Dept: WOUND CARE | Facility: HOSPITAL | Age: 70
Discharge: HOME | End: 2020-07-08
Attending: SURGERY
Payer: MEDICARE

## 2020-07-08 VITALS
WEIGHT: 234 LBS | BODY MASS INDEX: 38.99 KG/M2 | HEART RATE: 93 BPM | HEIGHT: 65 IN | DIASTOLIC BLOOD PRESSURE: 69 MMHG | TEMPERATURE: 98 F | SYSTOLIC BLOOD PRESSURE: 147 MMHG

## 2020-07-08 DIAGNOSIS — T81.89XA NON-HEALING SURGICAL WOUND, INITIAL ENCOUNTER: ICD-10-CM

## 2020-07-08 DIAGNOSIS — Z85.3 HISTORY OF BREAST CANCER: ICD-10-CM

## 2020-07-08 DIAGNOSIS — I10 BENIGN ESSENTIAL HYPERTENSION: ICD-10-CM

## 2020-07-08 DIAGNOSIS — T81.33XA TRAUMATIC WOUND DEHISCENCE, INITIAL ENCOUNTER: Primary | ICD-10-CM

## 2020-07-08 PROCEDURE — 99213 OFFICE O/P EST LOW 20 MIN: CPT | Mod: HCNC

## 2020-07-08 NOTE — PROGRESS NOTES
Subjective:       Patient ID: Stephanie Gonzalez is a 70 y.o. female.    Chief Complaint: Wound Consult    7/8/20:  New admit for nonhealing traumatic hand wound about 2 weeks ago. Patient was seen at an urgent care center where she received sutures for her injury. Pulses palpable, no apparent signs of infection noted. Patient states that she finish antibiotics given at urgent care.  Dr Hernadez assessed patient and removed sutures. Plan of care is Bactroban to wound daily, cover with adaptic and rolled gauze daily. F/U in 1 week.    Review of Systems   Constitutional: Negative.    HENT: Negative.    Eyes: Negative.    Respiratory: Negative.    Cardiovascular: Negative.    Gastrointestinal: Negative.    Genitourinary: Negative.    Musculoskeletal: Negative.    Skin: Negative.    Neurological: Negative.    Psychiatric/Behavioral: Negative.        Objective:      Physical Exam  Constitutional:       Appearance: She is well-developed.   HENT:      Head: Normocephalic.   Eyes:      Conjunctiva/sclera: Conjunctivae normal.      Pupils: Pupils are equal, round, and reactive to light.   Neck:      Musculoskeletal: Normal range of motion and neck supple.   Cardiovascular:      Rate and Rhythm: Normal rate and regular rhythm.      Heart sounds: Normal heart sounds.   Pulmonary:      Effort: Pulmonary effort is normal.      Breath sounds: Normal breath sounds.   Abdominal:      General: Bowel sounds are normal.      Palpations: Abdomen is soft.   Musculoskeletal: Normal range of motion.   Skin:     General: Skin is warm and dry.   Neurological:      Mental Status: She is alert and oriented to person, place, and time.      Deep Tendon Reflexes: Reflexes are normal and symmetric.         Assessment:       1. Traumatic wound dehiscence, initial encounter           Wound 07/08/20 1520 Traumatic Left Hand #1 (Active)   07/08/20 1520    Pre-existing: Yes   Primary Wound Type: Traumatic   Side: Left   Orientation:    Location: Hand    Wound Number (optional): #1   Ankle-Brachial Index:    Pulses: pulse palpable   Removal Indication and Assessment:    Wound Outcome:    (Retired) Wound Type:    (Retired) Wound Length (cm):    (Retired) Wound Width (cm):    (Retired) Depth (cm):    Wound Description (Comments):    Removal Indications:    Wound Image   07/08/20 1600   Dressing Appearance Dry;Intact;Moist drainage 07/08/20 1600   Drainage Amount Small 07/08/20 1600   Drainage Characteristics/Odor Serosanguineous 07/08/20 1600   Appearance Red;Maroon;Purple;Moist 07/08/20 1600   Tissue loss description Full thickness 07/08/20 1600   Black (%), Wound Tissue Color 5 % 07/08/20 1600   Red (%), Wound Tissue Color 95 % 07/08/20 1600   Periwound Area Ecchymotic;Swelling 07/08/20 1600   Wound Edges Irregular 07/08/20 1600   Wound Length (cm) 11 cm 07/08/20 1600   Wound Width (cm) 1 cm 07/08/20 1600   Wound Depth (cm) 0.2 cm 07/08/20 1600   Wound Volume (cm^3) 2.2 cm^3 07/08/20 1600   Wound Surface Area (cm^2) 11 cm^2 07/08/20 1600   Care Cleansed with:;Sterile normal saline 07/08/20 1600   Dressing Applied;Non-adherent;Rolled gauze;Other (see comments) 07/08/20 1600   Dressing Change Due 07/09/20 07/08/20 1600       Cleanse wound with:Normal Saline   Lidocaine: PRN   Primary dressing: Bactroban ointment to wound   Secondary dressing: Petroleum gauze, 4x4 gauze then wrap with conform rolled gauze, secure  With flexinet   Frequency: Daily dressing changes. (Patient/family to do home dressing changes)   Follow-up:1 week     Other Orders: Supplies for home dressing changes   Normal saline   4x4 gauze   Petroleum gauze (adatic)   2 inch conform gauze   flexinet   Paper tape     New admit for nonhealing traumatic hand wound about 2 weeks ago. Patient was seen at an urgent care center where she received sutures for her injury. Pulses palpable, no apparent signs of infection noted. Patient states that she finish antibiotics given at urgent care.  Dr Hernadez  assessed patient and removed sutures.Patient instructed on plan of care and voiced understanding.     Plan:                Follow up in about 1 week (around 7/15/2020).

## 2020-07-15 ENCOUNTER — HOSPITAL ENCOUNTER (OUTPATIENT)
Dept: WOUND CARE | Facility: HOSPITAL | Age: 70
Discharge: HOME | End: 2020-07-15
Attending: SURGERY
Payer: MEDICARE

## 2020-07-15 VITALS
DIASTOLIC BLOOD PRESSURE: 71 MMHG | WEIGHT: 234 LBS | TEMPERATURE: 97 F | SYSTOLIC BLOOD PRESSURE: 143 MMHG | HEART RATE: 81 BPM | HEIGHT: 65 IN | BODY MASS INDEX: 38.99 KG/M2

## 2020-07-15 DIAGNOSIS — I10 BENIGN ESSENTIAL HYPERTENSION: ICD-10-CM

## 2020-07-15 DIAGNOSIS — T81.33XD TRAUMATIC WOUND DEHISCENCE, SUBSEQUENT ENCOUNTER: Primary | ICD-10-CM

## 2020-07-15 DIAGNOSIS — T81.89XA NON-HEALING SURGICAL WOUND, INITIAL ENCOUNTER: ICD-10-CM

## 2020-07-15 DIAGNOSIS — J43.9 PULMONARY EMPHYSEMA, UNSPECIFIED EMPHYSEMA TYPE: ICD-10-CM

## 2020-07-15 PROCEDURE — 97597 DBRDMT OPN WND 1ST 20 CM/<: CPT | Mod: HCNC

## 2020-07-15 PROCEDURE — 27201912 HC WOUND CARE DEBRIDEMENT SUPPLIES: Mod: HCNC

## 2020-07-15 PROCEDURE — 11042 DBRDMT SUBQ TIS 1ST 20SQCM/<: CPT | Mod: HCNC

## 2020-07-15 NOTE — PROCEDURES
"Debridement    Date/Time: 7/15/2020 6:20 PM  Performed by: Ledy Hernadez MD  Authorized by: Ledy Hernadez MD     Time out: Immediately prior to procedure a "time out" was called to verify the correct patient, procedure, equipment, support staff and site/side marked as required.    Consent Done?:  Yes (Verbal)    Preparation: Patient was prepped and draped in usual sterile fashion    Local anesthesia used?: No      Wound Details:    Location:  Left hand    Type of Debridement:  Excisional       Length (cm):  3       Area (sq cm):  3       Width (cm):  1       Percent Debrided (%):  100       Depth (cm):  0.3       Total Area Debrided (sq cm):  3    Depth of debridement:  Subcutaneous tissue    Tissue debrided:  Adipose    Instruments:  Blade, Forceps and Scissors    2nd Wound Details:     Depth of debridement:  Epidermis/Dermis    Bleeding:  Minimal  Patient tolerance:  Patient tolerated the procedure well with no immediate complications      "

## 2020-07-15 NOTE — PROGRESS NOTES
Subjective:       Patient ID: Stephanie Gonzalez is a 70 y.o. female.    Chief Complaint: Wound Care    7/8/20:  New admit for nonhealing traumatic hand wound about 2 weeks ago. Patient was seen at an urgent care center where she received sutures for her injury. Pulses palpable, no apparent signs of infection noted. Patient states that she finish antibiotics given at urgent care.  Dr Hernadez assessed patient and removed sutures. Plan of care is Bactroban to wound daily, cover with adaptic and rolled gauze daily. F/U in 1 week.  07/15/2020- f/u with Dr. Hernadez. Wound debrided in clinic.wound cauterized with silver nitrate. Pt to f/u with Dr. Hernadez in 1 week 07/22/2020.    Review of Systems   Constitutional: Negative.    HENT: Negative.    Eyes: Negative.    Respiratory: Negative.    Cardiovascular: Negative.    Gastrointestinal: Negative.    Genitourinary: Negative.    Musculoskeletal: Negative.    Skin: Negative.    Neurological: Negative.    Psychiatric/Behavioral: Negative.        Objective:      Physical Exam  Constitutional:       Appearance: She is well-developed.   HENT:      Head: Normocephalic.   Eyes:      Conjunctiva/sclera: Conjunctivae normal.      Pupils: Pupils are equal, round, and reactive to light.   Neck:      Musculoskeletal: Normal range of motion and neck supple.   Cardiovascular:      Rate and Rhythm: Normal rate and regular rhythm.      Heart sounds: Normal heart sounds.   Pulmonary:      Effort: Pulmonary effort is normal.      Breath sounds: Normal breath sounds.   Abdominal:      General: Bowel sounds are normal.      Palpations: Abdomen is soft.   Musculoskeletal: Normal range of motion.   Skin:     General: Skin is warm and dry.   Neurological:      Mental Status: She is alert and oriented to person, place, and time.      Deep Tendon Reflexes: Reflexes are normal and symmetric.         Assessment:       1. Traumatic wound dehiscence, subsequent encounter           Wound 07/08/20 8717  Traumatic Left Hand #1 (Active)   07/08/20 1520    Pre-existing: Yes   Primary Wound Type: Traumatic   Side: Left   Orientation:    Location: Hand   Wound Number (optional): #1   Ankle-Brachial Index:    Pulses: pulse palpable   Removal Indication and Assessment:    Wound Outcome:    (Retired) Wound Type:    (Retired) Wound Length (cm):    (Retired) Wound Width (cm):    (Retired) Depth (cm):    Wound Description (Comments):    Removal Indications:    Wound Image   07/15/20 1500   Dressing Appearance Intact 07/15/20 1500   Drainage Amount Small 07/15/20 1500   Drainage Characteristics/Odor Serosanguineous 07/15/20 1500   Appearance Pink;Red;Yellow 07/15/20 1500   Tissue loss description Full thickness 07/15/20 1500   Red (%), Wound Tissue Color 60 % 07/15/20 1500   Yellow (%), Wound Tissue Color 40 % 07/15/20 1500   Periwound Area Redness;Dry 07/15/20 1500   Wound Edges Irregular 07/15/20 1500   Wound Length (cm) 3 cm 07/15/20 1500   Wound Width (cm) 0.8 cm 07/15/20 1500   Wound Depth (cm) 0.1 cm 07/15/20 1500   Wound Volume (cm^3) 0.24 cm^3 07/15/20 1500   Wound Surface Area (cm^2) 2.4 cm^2 07/15/20 1500   Care Cleansed with:;Sterile normal saline 07/15/20 1500   Dressing Non-adherent;Gauze;Rolled gauze 07/15/20 1500   Periwound Care Dry periwound area maintained 07/15/20 1500   Dressing Change Due 07/16/20 07/15/20 1500       Cleanse wound with:Normal Saline   Lidocaine: PRN   Primary dressing: Bactroban ointment to wound   Secondary dressing: adaptic, 4x4 gauze then wrap with conform rolled gauze, secure  With flexinet   Frequency: Daily dressing changes. (Patient/family to do home dressing changes)   Follow-up: 1 week 07/22/2020 with Dr. Hernadez    Wound debrided in clinic    Plan:                Follow up in about 1 week (around 7/22/2020).

## 2020-07-22 ENCOUNTER — HOSPITAL ENCOUNTER (OUTPATIENT)
Dept: WOUND CARE | Facility: HOSPITAL | Age: 70
Discharge: HOME | End: 2020-07-22
Attending: SURGERY
Payer: MEDICARE

## 2020-07-22 VITALS
TEMPERATURE: 99 F | WEIGHT: 234 LBS | DIASTOLIC BLOOD PRESSURE: 79 MMHG | SYSTOLIC BLOOD PRESSURE: 149 MMHG | HEART RATE: 88 BPM | HEIGHT: 65 IN | BODY MASS INDEX: 38.99 KG/M2

## 2020-07-22 DIAGNOSIS — T81.33XD TRAUMATIC WOUND DEHISCENCE, SUBSEQUENT ENCOUNTER: Primary | ICD-10-CM

## 2020-07-22 DIAGNOSIS — S61.412A LACERATION OF LEFT HAND WITHOUT FOREIGN BODY, INITIAL ENCOUNTER: ICD-10-CM

## 2020-07-22 PROCEDURE — 99212 OFFICE O/P EST SF 10 MIN: CPT | Mod: HCNC

## 2020-07-22 RX ORDER — MUPIROCIN 20 MG/G
OINTMENT TOPICAL
Qty: 22 G | Refills: 1 | Status: SHIPPED | OUTPATIENT
Start: 2020-07-22 | End: 2022-01-01

## 2020-07-22 RX ORDER — MUPIROCIN 20 MG/G
OINTMENT TOPICAL
Qty: 15 G | Refills: 1 | Status: SHIPPED | OUTPATIENT
Start: 2020-07-22 | End: 2022-01-01

## 2020-07-22 NOTE — PROGRESS NOTES
Subjective:       Patient ID: Stephanie Gonzalez is a 70 y.o. female.    Chief Complaint: Non-healing Wound Follow Up    7/8/20:  New admit for nonhealing traumatic hand wound about 2 weeks ago. Patient was seen at an urgent care center where she received sutures for her injury. Pulses palpable, no apparent signs of infection noted. Patient states that she finish antibiotics given at urgent care.  Dr Hernadez assessed patient and removed sutures. Plan of care is Bactroban to wound daily, cover with adaptic and rolled gauze daily. F/U in 1 week.  07/15/2020- f/u with Dr. Hernadez. Wound debrided in clinic.wound cauterized with silver nitrate. Pt to f/u with Dr. Hernadez in 1 week 07/22/2020.  07/22/2020: F/u with Dr. Hernadez. Wound resolved. Patient to follow up prn for any new wound complications.    Review of Systems   Constitutional: Negative.    HENT: Negative.    Eyes: Negative.    Respiratory: Negative.    Cardiovascular: Negative.    Gastrointestinal: Negative.    Genitourinary: Negative.    Musculoskeletal: Negative.    Skin: Negative.    Neurological: Negative.    Psychiatric/Behavioral: Negative.        Objective:      Physical Exam  Constitutional:       Appearance: She is well-developed.   HENT:      Head: Normocephalic.   Eyes:      Conjunctiva/sclera: Conjunctivae normal.      Pupils: Pupils are equal, round, and reactive to light.   Neck:      Musculoskeletal: Normal range of motion and neck supple.   Cardiovascular:      Rate and Rhythm: Normal rate and regular rhythm.      Heart sounds: Normal heart sounds.   Pulmonary:      Effort: Pulmonary effort is normal.      Breath sounds: Normal breath sounds.   Abdominal:      General: Bowel sounds are normal.      Palpations: Abdomen is soft.   Musculoskeletal: Normal range of motion.   Skin:     General: Skin is warm and dry.   Neurological:      Mental Status: She is alert and oriented to person, place, and time.      Deep Tendon Reflexes: Reflexes are  normal and symmetric.         Assessment:       1. Traumatic wound dehiscence, subsequent encounter    2. Laceration of left hand without foreign body, initial encounter      [REMOVED]      Wound 07/08/20 1520 Traumatic Left Hand #1 (Removed)   07/08/20 1520    Pre-existing: Yes   Primary Wound Type: Traumatic   Side: Left   Orientation:    Location: Hand   Wound Number (optional): #1   Ankle-Brachial Index:    Pulses: pulse palpable   Removal Indication and Assessment:    Wound Outcome: Healed   (Retired) Wound Type:    (Retired) Wound Length (cm):    (Retired) Wound Width (cm):    (Retired) Depth (cm):    Wound Description (Comments):    Removal Indications:    Removed 07/22/20 1540   Wound Image   07/22/20 1500   Dressing Appearance Intact 07/22/20 1500   Drainage Amount None 07/22/20 1500   Appearance Pink;Dry 07/22/20 1500   Tissue loss description Not applicable 07/22/20 1500   Periwound Area Dry 07/22/20 1500   Wound Length (cm) 0 cm 07/22/20 1500   Wound Width (cm) 0 cm 07/22/20 1500   Wound Depth (cm) 0 cm 07/22/20 1500   Wound Volume (cm^3) 0 cm^3 07/22/20 1500   Wound Surface Area (cm^2) 0 cm^2 07/22/20 1500   Care Cleansed with:;Sterile normal saline 07/22/20 1500   Dressing Applied;Other (see comments);Island/border 07/22/20 1500       Mupirocin cream applied and site covered with aquacel border foam    Plan:                Follow up if symptoms worsen or fail to improve.

## 2020-08-03 ENCOUNTER — TELEPHONE (OUTPATIENT)
Dept: FAMILY MEDICINE | Facility: CLINIC | Age: 70
End: 2020-08-03

## 2020-08-03 NOTE — TELEPHONE ENCOUNTER
Patient has an appt in Sept but going to do labs with another provider asking if you want labs to please enter in [facility]

## 2020-08-03 NOTE — TELEPHONE ENCOUNTER
----- Message from Jamila Pizarro sent at 8/3/2020  3:29 PM CDT -----  Contact: [phone]  Type:  Needs Medical Advice    Who Called: PT  Reason for Call: regarding talking to the nurse regarding Blood work orders  Would the patient rather a call back or a response via [facility]?  Call back  Best Call Back Number: [phone]

## 2020-09-28 DIAGNOSIS — J44.9 CHRONIC OBSTRUCTIVE PULMONARY DISEASE, UNSPECIFIED COPD TYPE: ICD-10-CM

## 2020-09-28 RX ORDER — FLUTICASONE PROPIONATE AND SALMETEROL 250; 50 UG/1; UG/1
1 POWDER RESPIRATORY (INHALATION) 2 TIMES DAILY
Qty: 60 EACH | Refills: 11 | Status: SHIPPED | OUTPATIENT
Start: 2020-09-28 | End: 2021-04-26 | Stop reason: SDUPTHER

## 2020-09-29 ENCOUNTER — PATIENT MESSAGE (OUTPATIENT)
Dept: OTHER | Facility: OTHER | Age: 70
End: 2020-09-29

## 2020-09-29 ENCOUNTER — OFFICE VISIT (OUTPATIENT)
Dept: FAMILY MEDICINE | Facility: CLINIC | Age: 70
End: 2020-09-29
Payer: MEDICARE

## 2020-09-29 VITALS
DIASTOLIC BLOOD PRESSURE: 68 MMHG | BODY MASS INDEX: 40.09 KG/M2 | WEIGHT: 240.63 LBS | RESPIRATION RATE: 18 BRPM | HEART RATE: 92 BPM | SYSTOLIC BLOOD PRESSURE: 124 MMHG | TEMPERATURE: 98 F | HEIGHT: 65 IN | OXYGEN SATURATION: 97 %

## 2020-09-29 DIAGNOSIS — D45 POLYCYTHEMIA VERA: ICD-10-CM

## 2020-09-29 DIAGNOSIS — M81.0 AGE-RELATED OSTEOPOROSIS WITHOUT CURRENT PATHOLOGICAL FRACTURE: ICD-10-CM

## 2020-09-29 DIAGNOSIS — Z12.11 ENCOUNTER FOR COLORECTAL CANCER SCREENING: ICD-10-CM

## 2020-09-29 DIAGNOSIS — J30.1 SEASONAL ALLERGIC RHINITIS DUE TO POLLEN: ICD-10-CM

## 2020-09-29 DIAGNOSIS — J43.9 PULMONARY EMPHYSEMA, UNSPECIFIED EMPHYSEMA TYPE: ICD-10-CM

## 2020-09-29 DIAGNOSIS — I10 BENIGN ESSENTIAL HYPERTENSION: Primary | ICD-10-CM

## 2020-09-29 DIAGNOSIS — Z85.3 HISTORY OF BREAST CANCER: ICD-10-CM

## 2020-09-29 DIAGNOSIS — Z12.12 ENCOUNTER FOR COLORECTAL CANCER SCREENING: ICD-10-CM

## 2020-09-29 DIAGNOSIS — Z11.59 ENCOUNTER FOR HEPATITIS C SCREENING TEST FOR LOW RISK PATIENT: ICD-10-CM

## 2020-09-29 PROCEDURE — 99499 RISK ADDL DX/OHS AUDIT: ICD-10-PCS | Mod: HCNC,S$GLB,, | Performed by: INTERNAL MEDICINE

## 2020-09-29 PROCEDURE — 3074F SYST BP LT 130 MM HG: CPT | Mod: HCNC,CPTII,S$GLB, | Performed by: INTERNAL MEDICINE

## 2020-09-29 PROCEDURE — 3008F BODY MASS INDEX DOCD: CPT | Mod: HCNC,CPTII,S$GLB, | Performed by: INTERNAL MEDICINE

## 2020-09-29 PROCEDURE — 99999 PR PBB SHADOW E&M-EST. PATIENT-LVL IV: ICD-10-PCS | Mod: PBBFAC,HCNC,, | Performed by: INTERNAL MEDICINE

## 2020-09-29 PROCEDURE — 1159F MED LIST DOCD IN RCRD: CPT | Mod: HCNC,S$GLB,, | Performed by: INTERNAL MEDICINE

## 2020-09-29 PROCEDURE — 99999 PR PBB SHADOW E&M-EST. PATIENT-LVL IV: CPT | Mod: PBBFAC,HCNC,, | Performed by: INTERNAL MEDICINE

## 2020-09-29 PROCEDURE — 1126F PR PAIN SEVERITY QUANTIFIED, NO PAIN PRESENT: ICD-10-PCS | Mod: HCNC,S$GLB,, | Performed by: INTERNAL MEDICINE

## 2020-09-29 PROCEDURE — 3078F DIAST BP <80 MM HG: CPT | Mod: HCNC,CPTII,S$GLB, | Performed by: INTERNAL MEDICINE

## 2020-09-29 PROCEDURE — 3008F PR BODY MASS INDEX (BMI) DOCUMENTED: ICD-10-PCS | Mod: HCNC,CPTII,S$GLB, | Performed by: INTERNAL MEDICINE

## 2020-09-29 PROCEDURE — 1126F AMNT PAIN NOTED NONE PRSNT: CPT | Mod: HCNC,S$GLB,, | Performed by: INTERNAL MEDICINE

## 2020-09-29 PROCEDURE — 99214 OFFICE O/P EST MOD 30 MIN: CPT | Mod: HCNC,S$GLB,, | Performed by: INTERNAL MEDICINE

## 2020-09-29 PROCEDURE — 99499 UNLISTED E&M SERVICE: CPT | Mod: HCNC,S$GLB,, | Performed by: INTERNAL MEDICINE

## 2020-09-29 PROCEDURE — 3074F PR MOST RECENT SYSTOLIC BLOOD PRESSURE < 130 MM HG: ICD-10-PCS | Mod: HCNC,CPTII,S$GLB, | Performed by: INTERNAL MEDICINE

## 2020-09-29 PROCEDURE — 99214 PR OFFICE/OUTPT VISIT, EST, LEVL IV, 30-39 MIN: ICD-10-PCS | Mod: HCNC,S$GLB,, | Performed by: INTERNAL MEDICINE

## 2020-09-29 PROCEDURE — 1101F PT FALLS ASSESS-DOCD LE1/YR: CPT | Mod: HCNC,CPTII,S$GLB, | Performed by: INTERNAL MEDICINE

## 2020-09-29 PROCEDURE — 3078F PR MOST RECENT DIASTOLIC BLOOD PRESSURE < 80 MM HG: ICD-10-PCS | Mod: HCNC,CPTII,S$GLB, | Performed by: INTERNAL MEDICINE

## 2020-09-29 PROCEDURE — 1159F PR MEDICATION LIST DOCUMENTED IN MEDICAL RECORD: ICD-10-PCS | Mod: HCNC,S$GLB,, | Performed by: INTERNAL MEDICINE

## 2020-09-29 PROCEDURE — 1101F PR PT FALLS ASSESS DOC 0-1 FALLS W/OUT INJ PAST YR: ICD-10-PCS | Mod: HCNC,CPTII,S$GLB, | Performed by: INTERNAL MEDICINE

## 2020-09-29 RX ORDER — AMLODIPINE BESYLATE 10 MG/1
10 TABLET ORAL DAILY
Qty: 90 TABLET | Refills: 3 | Status: SHIPPED | OUTPATIENT
Start: 2020-09-29 | End: 2021-01-01 | Stop reason: SDUPTHER

## 2020-09-29 RX ORDER — LISINOPRIL 40 MG/1
40 TABLET ORAL DAILY
Qty: 90 TABLET | Refills: 3 | Status: SHIPPED | OUTPATIENT
Start: 2020-09-29 | End: 2021-04-22

## 2020-09-29 NOTE — PROGRESS NOTES
Patient, Stephanie Gonzalez (MRN #[id number]), presented with a recorded BMI of 40.04 kg/m^2 consistent with the definition of morbid obesity (ICD-10 E66.01). The patient's morbid obesity was monitored, evaluated, addressed and/or treated. This addendum to the medical record is made on 09/29/2020.

## 2020-09-29 NOTE — PROGRESS NOTES
[facility] Note    Chief Complaint      Chief Complaint   Patient presents with    Follow-up     6 month follow up       History of Present Illness      Stephanie Gonzalez is a 70 y.o. female who presents today for   Chief Complaint   Patient presents with    Follow-up     6 month follow up   .  Patient comes to appointment here for 6 m checup for chronic issues as below . She will be getting flu vaccine with next visit . Will get colo guard for colorectal screening . Needs repeat labs will order . She will be getting labs for routine screening .     Problem List Items Addressed This Visit        Pulmonary    Chronic obstructive pulmonary disease    Overview     Is doing great wth advair 250/50 has rarely needed rescue cont current regimen             Cardiac/Vascular    Benign essential hypertension - Primary    Overview     bp well controlled on current regimen             Oncology    Polycythemia vera    Overview     Dr lugo managing cont same          History of breast cancer    Overview     Dr enriquez managing now is doing well on arimedex             Orthopedic    Age-related osteoporosis without current pathological fracture    Overview     cont prolia stable             Other    Seasonal allergic rhinitis due to pollen    Overview     stable on claritin cont same             Other Visit Diagnoses     Encounter for hepatitis C screening test for low risk patient        Encounter for colorectal cancer screening                Past Medical History:  Past Medical History:   Diagnosis Date    Cancer 2014    Breast    Hypertension        Past Surgical History:  Past Surgical History:   Procedure Laterality Date    BREAST SURGERY      ELBOW SURGERY         Family History:  family history includes Diabetes in her father; Heart disease in her brother, brother, and father; Stroke in her mother.    Social History:  Social History     Socioeconomic History    Marital status:       Spouse name: Not on file    Number of children: Not on file    Years of education: Not on file    Highest education level: Not on file   Occupational History    Not on file   Social Needs    Financial resource strain: Not hard at all    Food insecurity     Worry: Never true     Inability: Never true    Transportation needs     Medical: No     Non-medical: No   Tobacco Use    Smoking status: Former Smoker     Quit date: 7/3/2008     Years since quittin.2    Smokeless tobacco: Never Used   Substance and Sexual Activity    Alcohol use: Yes     Frequency: 2-3 times a week     Drinks per session: 1 or 2     Binge frequency: Never    Drug use: Not on file    Sexual activity: Not on file   Lifestyle    Physical activity     Days per week: Not on file     Minutes per session: Not on file    Stress: Not at all   Relationships    Social connections     Talks on phone: More than three times a week     Gets together: Twice a week     Attends Synagogue service: Not on file     Active member of club or organization: No     Attends meetings of clubs or organizations: Never     Relationship status:    Other Topics Concern    Not on file   Social History Narrative    Not on file       Review of Systems:   Review of Systems   Constitutional: Negative for fever and weight loss.   HENT: Negative for congestion, hearing loss and sore throat.    Eyes: Negative for blurred vision.   Respiratory: Negative for cough and shortness of breath.    Cardiovascular: Negative for chest pain, palpitations, claudication and leg swelling.   Gastrointestinal: Negative for abdominal pain, constipation, diarrhea, heartburn, nausea and vomiting.   Genitourinary: Negative for dysuria.   Musculoskeletal: Negative for back pain and myalgias.   Skin: Negative for rash.   Neurological: Negative for focal weakness and headaches.   Psychiatric/Behavioral: Negative for depression, memory loss and suicidal ideas. The patient is not  nervous/anxious.          Medications:  Outpatient Encounter Medications as of 9/29/2020   Medication Sig Dispense Refill    acetaminophen (TYLENOL) 500 MG tablet Take 500 mg by mouth every 6 (six) hours as needed for Pain.      albuterol (ACCUNEB) 0.63 mg/3 mL Nebu Take 3 mLs (0.63 mg total) by nebulization every 6 (six) hours as needed. Rescue 1 Box 0    amLODIPine (NORVASC) 10 MG tablet Take 1 tablet (10 mg total) by mouth once daily. 90 tablet 3    anastrozole (ARIMIDEX) 1 mg Tab Take 1 mg by mouth once daily.      calcium carbonate (OS-RAMÍREZ) 500 mg calcium (1,250 mg) tablet Take 1 tablet by mouth once daily.      denosumab (PROLIA) 60 mg/mL Syrg 60 mg.      docusate sodium (COLACE) 100 MG capsule Take 100 mg by mouth 2 (two) times daily.      ergocalciferol (VITAMIN D2) 50,000 unit Cap Take 50,000 Units by mouth every 7 days.      fluticasone-salmeterol diskus inhaler 250-50 mcg Inhale 1 puff into the lungs 2 (two) times daily. Controller 60 each 11    furosemide (LASIX) 20 MG tablet furosemide 20 mg tablet      lisinopriL (PRINIVIL,ZESTRIL) 40 MG tablet Take 1 tablet (40 mg total) by mouth once daily. 90 tablet 3    loratadine (CLARITIN) 10 mg tablet Take 10 mg by mouth once daily.      mupirocin (BACTROBAN) 2 % ointment Apply to affected area 3 times daily 22 g 1    mupirocin (BACTROBAN) 2 % ointment Apply to affected area one  times daily 15 g 1    predniSONE (DELTASONE) 10 MG tablet prednisone 10 mg tablet      [DISCONTINUED] amLODIPine (NORVASC) 10 MG tablet Take 1 tablet (10 mg total) by mouth once daily. 90 tablet 3    [DISCONTINUED] lisinopriL (PRINIVIL,ZESTRIL) 40 MG tablet Take 1 tablet (40 mg total) by mouth once daily. 90 tablet 2    cefUROXime (CEFTIN) 250 MG tablet cefuroxime axetil 250 mg tablet      influenza (FLUZONE HIGH-DOSE) 180 mcg/0.5 mL vaccine Fluzone High-Dose 1105-1302 (PF) 180 mcg/0.5 mL intramuscular syringe       No facility-administered encounter medications on  file as of 9/29/2020.         Allergies:  Review of patient's allergies indicates:  No Known Allergies      Physical Exam      Vitals:    09/29/20 1310   BP: 124/68   Pulse: 92   Resp: 18   Temp: 97.7 °F (36.5 °C)      Body mass index is 40.04 kg/m².    Physical Exam  Constitutional:       Appearance: She is well-developed.   Eyes:      Pupils: Pupils are equal, round, and reactive to light.   Neck:      Musculoskeletal: Normal range of motion.      Thyroid: No thyromegaly.   Cardiovascular:      Rate and Rhythm: Normal rate.      Heart sounds: Normal heart sounds. No murmur. No friction rub. No gallop.    Pulmonary:      Breath sounds: Normal breath sounds.   Abdominal:      General: Bowel sounds are normal.      Palpations: Abdomen is soft.   Musculoskeletal: Normal range of motion.   Lymphadenopathy:      Cervical: No cervical adenopathy.   Skin:     General: Skin is warm.      Findings: No rash.   Neurological:      Mental Status: She is alert and oriented to person, place, and time.      Cranial Nerves: No cranial nerve deficit.   Psychiatric:         Behavior: Behavior normal.          Laboratory:  CBC:  No results for input(s): WBC, RBC, HGB, HCT, PLT, MCV, MCH, MCHC in the last 2160 hours.  CMP:  No results for input(s): GLU, CALCIUM, ALBUMIN, PROT, NA, K, CO2, CL, BUN, ALKPHOS, ALT, AST, BILITOT in the last 2160 hours.    Invalid input(s): CREATININ  URINALYSIS:  No results for input(s): COLORU, CLARITYU, SPECGRAV, PHUR, PROTEINUA, GLUCOSEU, BILIRUBINCON, BLOODU, WBCU, RBCU, BACTERIA, MUCUS, NITRITE, LEUKOCYTESUR, UROBILINOGEN, HYALINECASTS in the last 2160 hours.   LIPIDS:  No results for input(s): TSH, HDL, CHOL, TRIG, LDLCALC, CHOLHDL, NONHDLCHOL, TOTALCHOLEST in the last 2160 hours.  TSH:  No results for input(s): TSH in the last 2160 hours.  A1C:  No results for input(s): HGBA1C in the last 2160 hours.    Radiology:        Assessment:     Stephanie Gonzalez is a 70 y.o.female with:    Benign essential  hypertension  -     CBC auto differential; Future; Expected date: 09/29/2020  -     Comprehensive metabolic panel; Future; Expected date: 09/29/2020  -     lisinopriL (PRINIVIL,ZESTRIL) 40 MG tablet; Take 1 tablet (40 mg total) by mouth once daily.  Dispense: 90 tablet; Refill: 3  -     amLODIPine (NORVASC) 10 MG tablet; Take 1 tablet (10 mg total) by mouth once daily.  Dispense: 90 tablet; Refill: 3    Polycythemia vera  -     CBC auto differential; Future; Expected date: 09/29/2020    Encounter for hepatitis C screening test for low risk patient  -     Hepatitis C Antibody; Future; Expected date: 09/29/2020    Encounter for colorectal cancer screening  -     Cologuard Screening (Multitarget Stool DNA); Future; Expected date: 09/29/2020    Seasonal allergic rhinitis due to pollen    Pulmonary emphysema, unspecified emphysema type    History of breast cancer    Age-related osteoporosis without current pathological fracture          Plan:     Problem List Items Addressed This Visit        Pulmonary    Chronic obstructive pulmonary disease    Overview     Is doing great wth advair 250/50 has rarely needed rescue cont current regimen             Cardiac/Vascular    Benign essential hypertension - Primary    Overview     bp well controlled on current regimen             Oncology    Polycythemia vera    Overview     Dr lugo managing cont same          History of breast cancer    Overview     Dr enriquez managing now is doing well on arimedex             Orthopedic    Age-related osteoporosis without current pathological fracture    Overview     cont prolia stable             Other    Seasonal allergic rhinitis due to pollen    Overview     stable on claritin cont same             Other Visit Diagnoses     Encounter for hepatitis C screening test for low risk patient        Encounter for colorectal cancer screening              As above, continue current medications and maintain follow up with specialists.  Return to  clinic in 6 months.      [facility] [place]

## 2020-10-05 ENCOUNTER — PATIENT MESSAGE (OUTPATIENT)
Dept: INTERNAL MEDICINE | Facility: CLINIC | Age: 70
End: 2020-10-05

## 2020-10-18 LAB
ALBUMIN: 4.3 GRAM/DL (ref 3.5–5)
ALP SERPL-CCNC: 77 UNIT/L (ref 38–126)
ALT SERPL W P-5'-P-CCNC: 12 UNIT/L (ref 7–56)
ANION GAP SERPL CALC-SCNC: 15 MEQ/L (ref 9–18)
AST SERPL-CCNC: 14 UNIT/L (ref 7–40)
BASOPHILS ABSOLUTE COUNT: 0 K/UL (ref 0–0.2)
BASOPHILS NFR BLD: 0.7 % (ref 0–2)
BILIRUB SERPL-MCNC: 0.7 MG/DL (ref 0–1.2)
BUN BLD-MCNC: 11 MG/DL (ref 7–21)
BUN/CREAT SERPL: 18 RATIO (ref 6–22)
CALC OSMOLALITY: 279 MOSM/KG (ref 275–295)
CALCIUM SERPL-MCNC: 9.5 MG/DL (ref 8.5–10.3)
CHLORIDE SERPL-SCNC: 101 MEQ/L (ref 98–107)
CO2 SERPL-SCNC: 29 MEQ/L (ref 21–31)
CREAT SERPL-MCNC: 0.6 MG/DL (ref 0.5–1)
DIFFERENTIAL TYPE: ABNORMAL
EOSINOPHIL NFR BLD: 2.3 % (ref 0–4)
EOSINOPHILS ABSOLUTE COUNT: 0.1 K/UL (ref 0–0.7)
ERYTHROCYTE [DISTWIDTH] IN BLOOD BY AUTOMATED COUNT: 13 GRAM/DL (ref 12–15.3)
GFR: 92.9 ML/MIN/1.73M2
GLUCOSE SERPL-MCNC: 104 MG/DL (ref 70–100)
HCT VFR BLD AUTO: 48.4 % (ref 37–47)
HCV AB S/CO SERPL IA: 0.01
HCV AB SERPL QL IA: NORMAL
HGB BLD-MCNC: 16.3 GRAM/DL (ref 12–16)
LYMPHOCYTES %: 20.7 % (ref 15–45)
LYMPHOCYTES ABSOLUTE COUNT: 1 K/UL (ref 1–4.2)
MCH RBC QN AUTO: 33.8 PICOGRAM (ref 27–33)
MCHC RBC AUTO-ENTMCNC: 33.6 GRAM/DL (ref 32–36)
MCV RBC AUTO: 100.4 FEMTOLITER (ref 81–99)
MONOCYTES %: 12.7 % (ref 3–13)
MONOCYTES ABSOLUTE COUNT: 0.6 K/UL (ref 0.1–0.8)
NEUTROPHILS ABSOLUTE COUNT: 3.1 K/UL (ref 2.1–7.6)
NEUTROPHILS RELATIVE PERCENT: 63.6 % (ref 32–80)
PLATELET # BLD AUTO: 225 K/UL (ref 150–350)
PMV BLD AUTO: 8.9 FEMTOLITER (ref 7–10.2)
POTASSIUM SERPL-SCNC: 4.8 MEQ/L (ref 3.5–5)
RBC # BLD AUTO: 4.83 MIL/UL (ref 4.2–5.4)
SODIUM BLD-SCNC: 140 MEQ/L (ref 135–145)
TOTAL PROTEIN: 6.4 GRAM/DL (ref 6.3–8.2)
WBC # BLD AUTO: 4.9 K/UL (ref 4.5–11)

## 2020-10-19 LAB — GASTROCULT GAST QL: NEGATIVE

## 2020-11-06 ENCOUNTER — TELEPHONE (OUTPATIENT)
Dept: ADMINISTRATIVE | Facility: HOSPITAL | Age: 70
End: 2020-11-06

## 2020-11-06 ENCOUNTER — TELEPHONE (OUTPATIENT)
Dept: FAMILY MEDICINE | Facility: CLINIC | Age: 70
End: 2020-11-06

## 2020-11-06 NOTE — TELEPHONE ENCOUNTER
----- Message from Ag North MD sent at 11/6/2020  4:31 PM CST -----  Notify patient of negative vologuard

## 2020-11-06 NOTE — TELEPHONE ENCOUNTER
Called and spoke with pt in regards of her Cologuard results. Pt verbalized understanding of message.

## 2020-12-11 ENCOUNTER — PATIENT MESSAGE (OUTPATIENT)
Dept: OTHER | Facility: OTHER | Age: 70
End: 2020-12-11

## 2021-01-01 ENCOUNTER — OFFICE VISIT (OUTPATIENT)
Dept: FAMILY MEDICINE | Facility: CLINIC | Age: 71
End: 2021-01-01
Payer: MEDICARE

## 2021-01-01 VITALS
DIASTOLIC BLOOD PRESSURE: 64 MMHG | OXYGEN SATURATION: 97 % | BODY MASS INDEX: 41.39 KG/M2 | TEMPERATURE: 98 F | WEIGHT: 248.44 LBS | HEIGHT: 65 IN | SYSTOLIC BLOOD PRESSURE: 130 MMHG | RESPIRATION RATE: 18 BRPM | HEART RATE: 86 BPM

## 2021-01-01 DIAGNOSIS — L03.115 CELLULITIS OF RIGHT LOWER EXTREMITY: ICD-10-CM

## 2021-01-01 DIAGNOSIS — J43.9 PULMONARY EMPHYSEMA, UNSPECIFIED EMPHYSEMA TYPE: Primary | ICD-10-CM

## 2021-01-01 DIAGNOSIS — I10 BENIGN ESSENTIAL HYPERTENSION: ICD-10-CM

## 2021-01-01 DIAGNOSIS — M81.0 AGE-RELATED OSTEOPOROSIS WITHOUT CURRENT PATHOLOGICAL FRACTURE: ICD-10-CM

## 2021-01-01 DIAGNOSIS — D45 POLYCYTHEMIA VERA: ICD-10-CM

## 2021-01-01 DIAGNOSIS — J30.1 SEASONAL ALLERGIC RHINITIS DUE TO POLLEN: ICD-10-CM

## 2021-01-01 DIAGNOSIS — Z85.3 HISTORY OF BREAST CANCER: ICD-10-CM

## 2021-01-01 PROCEDURE — 3078F PR MOST RECENT DIASTOLIC BLOOD PRESSURE < 80 MM HG: ICD-10-PCS | Mod: HCNC,CPTII,S$GLB, | Performed by: INTERNAL MEDICINE

## 2021-01-01 PROCEDURE — 1101F PT FALLS ASSESS-DOCD LE1/YR: CPT | Mod: HCNC,CPTII,S$GLB, | Performed by: INTERNAL MEDICINE

## 2021-01-01 PROCEDURE — 1160F PR REVIEW ALL MEDS BY PRESCRIBER/CLIN PHARMACIST DOCUMENTED: ICD-10-PCS | Mod: HCNC,CPTII,S$GLB, | Performed by: INTERNAL MEDICINE

## 2021-01-01 PROCEDURE — 1159F MED LIST DOCD IN RCRD: CPT | Mod: HCNC,CPTII,S$GLB, | Performed by: INTERNAL MEDICINE

## 2021-01-01 PROCEDURE — 99999 PR PBB SHADOW E&M-EST. PATIENT-LVL IV: ICD-10-PCS | Mod: PBBFAC,HCNC,, | Performed by: INTERNAL MEDICINE

## 2021-01-01 PROCEDURE — 3078F DIAST BP <80 MM HG: CPT | Mod: HCNC,CPTII,S$GLB, | Performed by: INTERNAL MEDICINE

## 2021-01-01 PROCEDURE — 3008F PR BODY MASS INDEX (BMI) DOCUMENTED: ICD-10-PCS | Mod: HCNC,CPTII,S$GLB, | Performed by: INTERNAL MEDICINE

## 2021-01-01 PROCEDURE — 4010F PR ACE/ARB THEARPY RXD/TAKEN: ICD-10-PCS | Mod: HCNC,CPTII,S$GLB, | Performed by: INTERNAL MEDICINE

## 2021-01-01 PROCEDURE — 3288F PR FALLS RISK ASSESSMENT DOCUMENTED: ICD-10-PCS | Mod: HCNC,CPTII,S$GLB, | Performed by: INTERNAL MEDICINE

## 2021-01-01 PROCEDURE — 3008F BODY MASS INDEX DOCD: CPT | Mod: HCNC,CPTII,S$GLB, | Performed by: INTERNAL MEDICINE

## 2021-01-01 PROCEDURE — 3288F FALL RISK ASSESSMENT DOCD: CPT | Mod: HCNC,CPTII,S$GLB, | Performed by: INTERNAL MEDICINE

## 2021-01-01 PROCEDURE — 1126F AMNT PAIN NOTED NONE PRSNT: CPT | Mod: HCNC,CPTII,S$GLB, | Performed by: INTERNAL MEDICINE

## 2021-01-01 PROCEDURE — 3075F SYST BP GE 130 - 139MM HG: CPT | Mod: HCNC,CPTII,S$GLB, | Performed by: INTERNAL MEDICINE

## 2021-01-01 PROCEDURE — 99214 OFFICE O/P EST MOD 30 MIN: CPT | Mod: HCNC,S$GLB,, | Performed by: INTERNAL MEDICINE

## 2021-01-01 PROCEDURE — 1126F PR PAIN SEVERITY QUANTIFIED, NO PAIN PRESENT: ICD-10-PCS | Mod: HCNC,CPTII,S$GLB, | Performed by: INTERNAL MEDICINE

## 2021-01-01 PROCEDURE — 1160F RVW MEDS BY RX/DR IN RCRD: CPT | Mod: HCNC,CPTII,S$GLB, | Performed by: INTERNAL MEDICINE

## 2021-01-01 PROCEDURE — 1101F PR PT FALLS ASSESS DOC 0-1 FALLS W/OUT INJ PAST YR: ICD-10-PCS | Mod: HCNC,CPTII,S$GLB, | Performed by: INTERNAL MEDICINE

## 2021-01-01 PROCEDURE — 99214 PR OFFICE/OUTPT VISIT, EST, LEVL IV, 30-39 MIN: ICD-10-PCS | Mod: HCNC,S$GLB,, | Performed by: INTERNAL MEDICINE

## 2021-01-01 PROCEDURE — 3075F PR MOST RECENT SYSTOLIC BLOOD PRESS GE 130-139MM HG: ICD-10-PCS | Mod: HCNC,CPTII,S$GLB, | Performed by: INTERNAL MEDICINE

## 2021-01-01 PROCEDURE — 99499 UNLISTED E&M SERVICE: CPT | Mod: S$GLB,,, | Performed by: INTERNAL MEDICINE

## 2021-01-01 PROCEDURE — 4010F ACE/ARB THERAPY RXD/TAKEN: CPT | Mod: HCNC,CPTII,S$GLB, | Performed by: INTERNAL MEDICINE

## 2021-01-01 PROCEDURE — 1159F PR MEDICATION LIST DOCUMENTED IN MEDICAL RECORD: ICD-10-PCS | Mod: HCNC,CPTII,S$GLB, | Performed by: INTERNAL MEDICINE

## 2021-01-01 PROCEDURE — 99999 PR PBB SHADOW E&M-EST. PATIENT-LVL IV: CPT | Mod: PBBFAC,HCNC,, | Performed by: INTERNAL MEDICINE

## 2021-01-01 PROCEDURE — 99499 RISK ADDL DX/OHS AUDIT: ICD-10-PCS | Mod: S$GLB,,, | Performed by: INTERNAL MEDICINE

## 2021-01-01 RX ORDER — LISINOPRIL 40 MG/1
40 TABLET ORAL DAILY
Qty: 90 TABLET | Refills: 3 | Status: SHIPPED | OUTPATIENT
Start: 2021-01-01

## 2021-01-01 RX ORDER — AMLODIPINE BESYLATE 10 MG/1
10 TABLET ORAL DAILY
Qty: 90 TABLET | Refills: 3 | Status: SHIPPED | OUTPATIENT
Start: 2021-01-01

## 2021-01-01 RX ORDER — SULFAMETHOXAZOLE AND TRIMETHOPRIM 800; 160 MG/1; MG/1
1 TABLET ORAL 2 TIMES DAILY
Qty: 14 TABLET | Refills: 0 | Status: SHIPPED | OUTPATIENT
Start: 2021-01-01 | End: 2022-01-01

## 2021-01-05 ENCOUNTER — PATIENT MESSAGE (OUTPATIENT)
Dept: FAMILY MEDICINE | Facility: CLINIC | Age: 71
End: 2021-01-05

## 2021-01-09 ENCOUNTER — IMMUNIZATION (OUTPATIENT)
Dept: INTERNAL MEDICINE | Facility: CLINIC | Age: 71
End: 2021-01-09
Payer: MEDICARE

## 2021-01-09 DIAGNOSIS — Z23 NEED FOR VACCINATION: ICD-10-CM

## 2021-01-09 PROCEDURE — 91300 COVID-19, MRNA, LNP-S, PF, 30 MCG/0.3 ML DOSE VACCINE: CPT | Mod: PBBFAC | Performed by: INTERNAL MEDICINE

## 2021-01-30 ENCOUNTER — IMMUNIZATION (OUTPATIENT)
Dept: INTERNAL MEDICINE | Facility: CLINIC | Age: 71
End: 2021-01-30
Payer: MEDICARE

## 2021-01-30 DIAGNOSIS — Z23 NEED FOR VACCINATION: Primary | ICD-10-CM

## 2021-01-30 PROCEDURE — 0002A PR IMMUNIZ ADMIN, SARS-COV-2 COVID-19 VACC, 30MCG/0.3ML, 2ND DOSE: CPT | Mod: PBBFAC | Performed by: INTERNAL MEDICINE

## 2021-01-30 PROCEDURE — 91300 PR SARS-COV- 2 COVID-19 VACCINE, NO PRSV, 30MCG/0.3ML, IM: CPT | Mod: PBBFAC | Performed by: INTERNAL MEDICINE

## 2021-01-30 RX ADMIN — RNA INGREDIENT BNT-162B2 0.3 ML: 0.23 INJECTION, SUSPENSION INTRAMUSCULAR at 08:01

## 2021-03-16 ENCOUNTER — PATIENT OUTREACH (OUTPATIENT)
Dept: ADMINISTRATIVE | Facility: HOSPITAL | Age: 71
End: 2021-03-16

## 2021-03-16 ENCOUNTER — TELEPHONE (OUTPATIENT)
Dept: ADMINISTRATIVE | Facility: HOSPITAL | Age: 71
End: 2021-03-16

## 2021-03-18 ENCOUNTER — PATIENT MESSAGE (OUTPATIENT)
Dept: RESEARCH | Facility: HOSPITAL | Age: 71
End: 2021-03-18

## 2021-03-24 ENCOUNTER — PES CALL (OUTPATIENT)
Dept: ADMINISTRATIVE | Facility: CLINIC | Age: 71
End: 2021-03-24

## 2021-03-26 ENCOUNTER — PATIENT MESSAGE (OUTPATIENT)
Dept: RESEARCH | Facility: HOSPITAL | Age: 71
End: 2021-03-26

## 2021-03-30 ENCOUNTER — OFFICE VISIT (OUTPATIENT)
Dept: FAMILY MEDICINE | Facility: CLINIC | Age: 71
End: 2021-03-30
Payer: MEDICARE

## 2021-03-30 ENCOUNTER — TELEPHONE (OUTPATIENT)
Dept: ADMINISTRATIVE | Facility: HOSPITAL | Age: 71
End: 2021-03-30

## 2021-03-30 ENCOUNTER — PATIENT OUTREACH (OUTPATIENT)
Dept: ADMINISTRATIVE | Facility: HOSPITAL | Age: 71
End: 2021-03-30

## 2021-03-30 VITALS
TEMPERATURE: 98 F | HEART RATE: 88 BPM | HEIGHT: 65 IN | SYSTOLIC BLOOD PRESSURE: 124 MMHG | BODY MASS INDEX: 40.62 KG/M2 | DIASTOLIC BLOOD PRESSURE: 80 MMHG | WEIGHT: 243.81 LBS | RESPIRATION RATE: 18 BRPM | OXYGEN SATURATION: 96 %

## 2021-03-30 DIAGNOSIS — Z85.3 HISTORY OF BREAST CANCER: ICD-10-CM

## 2021-03-30 DIAGNOSIS — J30.1 SEASONAL ALLERGIC RHINITIS DUE TO POLLEN: ICD-10-CM

## 2021-03-30 DIAGNOSIS — D45 POLYCYTHEMIA VERA: Primary | ICD-10-CM

## 2021-03-30 DIAGNOSIS — J43.9 PULMONARY EMPHYSEMA, UNSPECIFIED EMPHYSEMA TYPE: ICD-10-CM

## 2021-03-30 DIAGNOSIS — R60.9 EDEMA, UNSPECIFIED TYPE: ICD-10-CM

## 2021-03-30 DIAGNOSIS — I10 BENIGN ESSENTIAL HYPERTENSION: ICD-10-CM

## 2021-03-30 PROCEDURE — 3008F BODY MASS INDEX DOCD: CPT | Mod: CPTII,S$GLB,, | Performed by: INTERNAL MEDICINE

## 2021-03-30 PROCEDURE — 99999 PR PBB SHADOW E&M-EST. PATIENT-LVL IV: CPT | Mod: PBBFAC,,, | Performed by: INTERNAL MEDICINE

## 2021-03-30 PROCEDURE — 1159F MED LIST DOCD IN RCRD: CPT | Mod: S$GLB,,, | Performed by: INTERNAL MEDICINE

## 2021-03-30 PROCEDURE — 99499 UNLISTED E&M SERVICE: CPT | Mod: S$GLB,,, | Performed by: INTERNAL MEDICINE

## 2021-03-30 PROCEDURE — 3074F PR MOST RECENT SYSTOLIC BLOOD PRESSURE < 130 MM HG: ICD-10-PCS | Mod: CPTII,S$GLB,, | Performed by: INTERNAL MEDICINE

## 2021-03-30 PROCEDURE — 3008F PR BODY MASS INDEX (BMI) DOCUMENTED: ICD-10-PCS | Mod: CPTII,S$GLB,, | Performed by: INTERNAL MEDICINE

## 2021-03-30 PROCEDURE — 99999 PR PBB SHADOW E&M-EST. PATIENT-LVL IV: ICD-10-PCS | Mod: PBBFAC,,, | Performed by: INTERNAL MEDICINE

## 2021-03-30 PROCEDURE — 99214 PR OFFICE/OUTPT VISIT, EST, LEVL IV, 30-39 MIN: ICD-10-PCS | Mod: S$GLB,,, | Performed by: INTERNAL MEDICINE

## 2021-03-30 PROCEDURE — 3079F DIAST BP 80-89 MM HG: CPT | Mod: CPTII,S$GLB,, | Performed by: INTERNAL MEDICINE

## 2021-03-30 PROCEDURE — 3288F PR FALLS RISK ASSESSMENT DOCUMENTED: ICD-10-PCS | Mod: CPTII,S$GLB,, | Performed by: INTERNAL MEDICINE

## 2021-03-30 PROCEDURE — 3288F FALL RISK ASSESSMENT DOCD: CPT | Mod: CPTII,S$GLB,, | Performed by: INTERNAL MEDICINE

## 2021-03-30 PROCEDURE — 1159F PR MEDICATION LIST DOCUMENTED IN MEDICAL RECORD: ICD-10-PCS | Mod: S$GLB,,, | Performed by: INTERNAL MEDICINE

## 2021-03-30 PROCEDURE — 3074F SYST BP LT 130 MM HG: CPT | Mod: CPTII,S$GLB,, | Performed by: INTERNAL MEDICINE

## 2021-03-30 PROCEDURE — 99499 RISK ADDL DX/OHS AUDIT: ICD-10-PCS | Mod: S$GLB,,, | Performed by: INTERNAL MEDICINE

## 2021-03-30 PROCEDURE — 1101F PT FALLS ASSESS-DOCD LE1/YR: CPT | Mod: CPTII,S$GLB,, | Performed by: INTERNAL MEDICINE

## 2021-03-30 PROCEDURE — 1126F PR PAIN SEVERITY QUANTIFIED, NO PAIN PRESENT: ICD-10-PCS | Mod: S$GLB,,, | Performed by: INTERNAL MEDICINE

## 2021-03-30 PROCEDURE — 1101F PR PT FALLS ASSESS DOC 0-1 FALLS W/OUT INJ PAST YR: ICD-10-PCS | Mod: CPTII,S$GLB,, | Performed by: INTERNAL MEDICINE

## 2021-03-30 PROCEDURE — 99214 OFFICE O/P EST MOD 30 MIN: CPT | Mod: S$GLB,,, | Performed by: INTERNAL MEDICINE

## 2021-03-30 PROCEDURE — 3079F PR MOST RECENT DIASTOLIC BLOOD PRESSURE 80-89 MM HG: ICD-10-PCS | Mod: CPTII,S$GLB,, | Performed by: INTERNAL MEDICINE

## 2021-03-30 PROCEDURE — 1126F AMNT PAIN NOTED NONE PRSNT: CPT | Mod: S$GLB,,, | Performed by: INTERNAL MEDICINE

## 2021-03-30 RX ORDER — FUROSEMIDE 20 MG/1
20 TABLET ORAL DAILY PRN
Qty: 90 TABLET | Refills: 0 | Status: SHIPPED | OUTPATIENT
Start: 2021-03-30 | End: 2021-01-01

## 2021-04-26 DIAGNOSIS — J44.9 CHRONIC OBSTRUCTIVE PULMONARY DISEASE, UNSPECIFIED COPD TYPE: ICD-10-CM

## 2021-04-26 RX ORDER — FLUTICASONE PROPIONATE AND SALMETEROL 250; 50 UG/1; UG/1
1 POWDER RESPIRATORY (INHALATION) 2 TIMES DAILY
Qty: 180 EACH | Refills: 3 | Status: SHIPPED | OUTPATIENT
Start: 2021-04-26 | End: 2022-01-01

## 2021-09-30 PROBLEM — L03.115 CELLULITIS OF RIGHT LOWER EXTREMITY: Status: ACTIVE | Noted: 2021-01-01

## 2022-01-01 ENCOUNTER — OFFICE VISIT (OUTPATIENT)
Dept: INTERNAL MEDICINE | Facility: CLINIC | Age: 72
End: 2022-01-01
Payer: MEDICARE

## 2022-01-01 VITALS
HEART RATE: 91 BPM | TEMPERATURE: 99 F | DIASTOLIC BLOOD PRESSURE: 60 MMHG | RESPIRATION RATE: 18 BRPM | SYSTOLIC BLOOD PRESSURE: 124 MMHG | HEIGHT: 65 IN | OXYGEN SATURATION: 97 % | WEIGHT: 244.19 LBS | BODY MASS INDEX: 40.68 KG/M2

## 2022-01-01 DIAGNOSIS — I10 BENIGN ESSENTIAL HYPERTENSION: ICD-10-CM

## 2022-01-01 DIAGNOSIS — J43.9 PULMONARY EMPHYSEMA, UNSPECIFIED EMPHYSEMA TYPE: ICD-10-CM

## 2022-01-01 DIAGNOSIS — Z85.3 HISTORY OF BREAST CANCER: ICD-10-CM

## 2022-01-01 DIAGNOSIS — D45 POLYCYTHEMIA VERA: ICD-10-CM

## 2022-01-01 DIAGNOSIS — J44.9 CHRONIC OBSTRUCTIVE PULMONARY DISEASE, UNSPECIFIED COPD TYPE: ICD-10-CM

## 2022-01-01 DIAGNOSIS — M81.0 AGE-RELATED OSTEOPOROSIS WITHOUT CURRENT PATHOLOGICAL FRACTURE: Primary | ICD-10-CM

## 2022-01-01 PROCEDURE — 1101F PR PT FALLS ASSESS DOC 0-1 FALLS W/OUT INJ PAST YR: ICD-10-PCS | Mod: CPTII,S$GLB,, | Performed by: INTERNAL MEDICINE

## 2022-01-01 PROCEDURE — 3008F BODY MASS INDEX DOCD: CPT | Mod: CPTII,S$GLB,, | Performed by: INTERNAL MEDICINE

## 2022-01-01 PROCEDURE — 3074F PR MOST RECENT SYSTOLIC BLOOD PRESSURE < 130 MM HG: ICD-10-PCS | Mod: CPTII,S$GLB,, | Performed by: INTERNAL MEDICINE

## 2022-01-01 PROCEDURE — 4010F PR ACE/ARB THEARPY RXD/TAKEN: ICD-10-PCS | Mod: CPTII,S$GLB,, | Performed by: INTERNAL MEDICINE

## 2022-01-01 PROCEDURE — 3078F DIAST BP <80 MM HG: CPT | Mod: CPTII,S$GLB,, | Performed by: INTERNAL MEDICINE

## 2022-01-01 PROCEDURE — 1159F PR MEDICATION LIST DOCUMENTED IN MEDICAL RECORD: ICD-10-PCS | Mod: CPTII,S$GLB,, | Performed by: INTERNAL MEDICINE

## 2022-01-01 PROCEDURE — 3078F PR MOST RECENT DIASTOLIC BLOOD PRESSURE < 80 MM HG: ICD-10-PCS | Mod: CPTII,S$GLB,, | Performed by: INTERNAL MEDICINE

## 2022-01-01 PROCEDURE — 3074F SYST BP LT 130 MM HG: CPT | Mod: CPTII,S$GLB,, | Performed by: INTERNAL MEDICINE

## 2022-01-01 PROCEDURE — 1160F PR REVIEW ALL MEDS BY PRESCRIBER/CLIN PHARMACIST DOCUMENTED: ICD-10-PCS | Mod: CPTII,S$GLB,, | Performed by: INTERNAL MEDICINE

## 2022-01-01 PROCEDURE — 3288F PR FALLS RISK ASSESSMENT DOCUMENTED: ICD-10-PCS | Mod: CPTII,S$GLB,, | Performed by: INTERNAL MEDICINE

## 2022-01-01 PROCEDURE — 1101F PT FALLS ASSESS-DOCD LE1/YR: CPT | Mod: CPTII,S$GLB,, | Performed by: INTERNAL MEDICINE

## 2022-01-01 PROCEDURE — 99999 PR PBB SHADOW E&M-EST. PATIENT-LVL IV: ICD-10-PCS | Mod: PBBFAC,,, | Performed by: INTERNAL MEDICINE

## 2022-01-01 PROCEDURE — 1160F RVW MEDS BY RX/DR IN RCRD: CPT | Mod: CPTII,S$GLB,, | Performed by: INTERNAL MEDICINE

## 2022-01-01 PROCEDURE — 1126F AMNT PAIN NOTED NONE PRSNT: CPT | Mod: CPTII,S$GLB,, | Performed by: INTERNAL MEDICINE

## 2022-01-01 PROCEDURE — 4010F ACE/ARB THERAPY RXD/TAKEN: CPT | Mod: CPTII,S$GLB,, | Performed by: INTERNAL MEDICINE

## 2022-01-01 PROCEDURE — 99214 OFFICE O/P EST MOD 30 MIN: CPT | Mod: S$GLB,,, | Performed by: INTERNAL MEDICINE

## 2022-01-01 PROCEDURE — 3008F PR BODY MASS INDEX (BMI) DOCUMENTED: ICD-10-PCS | Mod: CPTII,S$GLB,, | Performed by: INTERNAL MEDICINE

## 2022-01-01 PROCEDURE — 1159F MED LIST DOCD IN RCRD: CPT | Mod: CPTII,S$GLB,, | Performed by: INTERNAL MEDICINE

## 2022-01-01 PROCEDURE — 1126F PR PAIN SEVERITY QUANTIFIED, NO PAIN PRESENT: ICD-10-PCS | Mod: CPTII,S$GLB,, | Performed by: INTERNAL MEDICINE

## 2022-01-01 PROCEDURE — 99214 PR OFFICE/OUTPT VISIT, EST, LEVL IV, 30-39 MIN: ICD-10-PCS | Mod: S$GLB,,, | Performed by: INTERNAL MEDICINE

## 2022-01-01 PROCEDURE — 3288F FALL RISK ASSESSMENT DOCD: CPT | Mod: CPTII,S$GLB,, | Performed by: INTERNAL MEDICINE

## 2022-01-01 PROCEDURE — 99999 PR PBB SHADOW E&M-EST. PATIENT-LVL IV: CPT | Mod: PBBFAC,,, | Performed by: INTERNAL MEDICINE

## 2022-01-01 RX ORDER — FLUTICASONE PROPIONATE AND SALMETEROL 250; 50 UG/1; UG/1
POWDER RESPIRATORY (INHALATION)
Qty: 180 EACH | Refills: 3 | Status: SHIPPED | OUTPATIENT
Start: 2022-01-01

## 2022-03-29 NOTE — PROGRESS NOTES
[facility] Note    Chief Complaint      Chief Complaint   Patient presents with    Follow-up     6m       History of Present Illness      Stephanie Gonzalez is a 72 y.o. female who presents today for   Chief Complaint   Patient presents with    Follow-up     6m   .  Patient comes to appointment here for 6 m checkup for chrinc issues assbelow . She just had labs last month with dr enriquez all reviewed today by me . She had breast u/s with dr tijerina 3/7 self reported as normal .     Problem List Items Addressed This Visit        Pulmonary    Chronic obstructive pulmonary disease    Overview     Is doing great wth advair 250/50 has rarely needed rescue cont current regimen               Cardiac/Vascular    Benign essential hypertension    Overview     bp well controlled on current regimen               Oncology    Polycythemia vera    Overview     Dr lugo managing cont same            History of breast cancer    Overview     Dr enriquez managing on arimedex   U/s report will be uploaded when available               Orthopedic    Age-related osteoporosis without current pathological fracture - Primary    Overview     cont prolia stable                    Past Medical History:  Past Medical History:   Diagnosis Date    Cancer 2014    Breast    Hypertension        Past Surgical History:  Past Surgical History:   Procedure Laterality Date    BREAST SURGERY      ELBOW SURGERY         Family History:  family history includes Diabetes in her father; Heart disease in her brother, brother, and father; Stroke in her mother.    Social History:  Social History     Socioeconomic History    Marital status:    Tobacco Use    Smoking status: Former Smoker     Quit date: 7/3/2008     Years since quittin.7    Smokeless tobacco: Never Used   Substance and Sexual Activity    Alcohol use: Yes       Review of Systems:   Review of Systems   Constitutional: Negative for fever and weight  loss.   HENT: Negative for congestion, hearing loss and sore throat.    Eyes: Negative for blurred vision.   Respiratory: Negative for cough and shortness of breath.    Cardiovascular: Negative for chest pain, palpitations, claudication and leg swelling.   Gastrointestinal: Negative for abdominal pain, constipation, diarrhea, heartburn, nausea and vomiting.   Genitourinary: Negative for dysuria.   Musculoskeletal: Negative for back pain and myalgias.   Skin: Negative for rash.   Neurological: Negative for focal weakness and headaches.   Psychiatric/Behavioral: Negative for depression, memory loss and suicidal ideas. The patient is not nervous/anxious.          Medications:  Outpatient Encounter Medications as of 3/29/2022   Medication Sig Dispense Refill    acetaminophen (TYLENOL) 500 MG tablet Take 500 mg by mouth every 6 (six) hours as needed for Pain.      amLODIPine (NORVASC) 10 MG tablet Take 1 tablet (10 mg total) by mouth once daily. 90 tablet 3    anastrozole (ARIMIDEX) 1 mg Tab Take 1 mg by mouth once daily.      denosumab (PROLIA) 60 mg/mL Syrg 60 mg.      docusate sodium (COLACE) 100 MG capsule Take 100 mg by mouth 2 (two) times daily.      ergocalciferol (ERGOCALCIFEROL) 50,000 unit Cap Take 50,000 Units by mouth every 7 days.      furosemide (LASIX) 20 MG tablet TAKE 1 TABLET(20 MG) BY MOUTH DAILY AS NEEDED FOR SWELLING 90 tablet 0    influenza (FLUZONE HIGH-DOSE) 180 mcg/0.5 mL vaccine Fluzone High-Dose 6373-9906 (PF) 180 mcg/0.5 mL intramuscular syringe      lisinopriL (PRINIVIL,ZESTRIL) 40 MG tablet Take 1 tablet (40 mg total) by mouth once daily. 90 tablet 3    loratadine (CLARITIN) 10 mg tablet Take 10 mg by mouth once daily.      fluticasone-salmeterol diskus inhaler 250-50 mcg Inhale 1 puff into the lungs 2 (two) times daily. Controller 180 each 3    [DISCONTINUED] albuterol (ACCUNEB) 0.63 mg/3 mL Nebu Take 3 mLs (0.63 mg total) by nebulization every 6 (six) hours as needed. Rescue 1  Box 0    [DISCONTINUED] calcium carbonate (OS-RAMÍREZ) 500 mg calcium (1,250 mg) tablet Take 1 tablet by mouth once daily.      [DISCONTINUED] mupirocin (BACTROBAN) 2 % ointment Apply to affected area 3 times daily (Patient not taking: Reported on 3/29/2022) 22 g 1    [DISCONTINUED] mupirocin (BACTROBAN) 2 % ointment Apply to affected area one  times daily (Patient not taking: Reported on 3/29/2022) 15 g 1    [DISCONTINUED] sulfamethoxazole-trimethoprim 800-160mg (BACTRIM DS) 800-160 mg Tab Take 1 tablet by mouth 2 (two) times daily. (Patient not taking: Reported on 3/29/2022) 14 tablet 0     No facility-administered encounter medications on file as of 3/29/2022.        Allergies:  Review of patient's allergies indicates:  No Known Allergies      Physical Exam         Vitals:    03/29/22 1313   BP: 124/60   Pulse: 91   Resp: 18   Temp: 98.7 °F (37.1 °C)         Physical Exam  Constitutional:       Appearance: She is well-developed.   Eyes:      Pupils: Pupils are equal, round, and reactive to light.   Neck:      Thyroid: No thyromegaly.   Cardiovascular:      Rate and Rhythm: Normal rate.      Heart sounds: Normal heart sounds. No murmur heard.    No friction rub. No gallop.   Pulmonary:      Breath sounds: Normal breath sounds.   Abdominal:      General: Bowel sounds are normal.      Palpations: Abdomen is soft.   Musculoskeletal:         General: Normal range of motion.      Cervical back: Normal range of motion.   Lymphadenopathy:      Cervical: No cervical adenopathy.   Skin:     General: Skin is warm.      Findings: No rash.   Neurological:      Mental Status: She is alert and oriented to person, place, and time.      Cranial Nerves: No cranial nerve deficit.   Psychiatric:         Behavior: Behavior normal.          Laboratory:  CBC:  No results for input(s): WBC, RBC, HGB, HCT, PLT, MCV, MCH, MCHC in the last 2160 hours.  CMP:  No results for input(s): GLU, CALCIUM, ALBUMIN, PROT, NA, K, CO2, CL, BUN,  ALKPHOS, ALT, AST, BILITOT in the last 2160 hours.    Invalid input(s): CREATININ  URINALYSIS:  No results for input(s): COLORU, CLARITYU, SPECGRAV, PHUR, PROTEINUA, GLUCOSEU, BILIRUBINCON, BLOODU, WBCU, RBCU, BACTERIA, MUCUS, NITRITE, LEUKOCYTESUR, UROBILINOGEN, HYALINECASTS in the last 2160 hours.   LIPIDS:  No results for input(s): TSH, HDL, CHOL, TRIG, LDLCALC, CHOLHDL, NONHDLCHOL, TOTALCHOLEST in the last 2160 hours.  TSH:  No results for input(s): TSH in the last 2160 hours.  A1C:  No results for input(s): HGBA1C in the last 2160 hours.    Radiology:        Assessment:     Stephanie Gonzalez is a 72 y.o.female with:    Age-related osteoporosis without current pathological fracture    Benign essential hypertension    Pulmonary emphysema, unspecified emphysema type    History of breast cancer    Polycythemia vera          Plan:     Problem List Items Addressed This Visit        Pulmonary    Chronic obstructive pulmonary disease    Overview     Is doing great wth advair 250/50 has rarely needed rescue cont current regimen               Cardiac/Vascular    Benign essential hypertension    Overview     bp well controlled on current regimen               Oncology    Polycythemia vera    Overview     Dr lugo managing cont same            History of breast cancer    Overview     Dr enriquez managing on arimedex   U/s report will be uploaded when available               Orthopedic    Age-related osteoporosis without current pathological fracture - Primary    Overview     cont prolia stable                  As above, continue current medications and maintain follow up with specialists.  Return to clinic in 6  months.      Frederick W Dantagnan [facility] [place]

## 2022-03-29 NOTE — LETTER
AUTHORIZATION FOR RELEASE OF   CONFIDENTIAL INFORMATION    Dear DIS    We are seeing Stephanie Gonzalez, date of birth 1950, in the clinic at [facility]. Ag North MD is the patient's PCP. Stephanie Gonzalez has an outstanding lab/procedure at the time we reviewed her chart. In order to help keep her health information updated, she has authorized us to request the following medical record(s):        (  )  MAMMOGRAM                                      (  )  COLONOSCOPY      (  )  PAP SMEAR                                          (  )  OUTSIDE LAB RESULTS     (  )  DEXA SCAN                                          (  )  EYE EXAM            (  )  FOOT EXAM                                          (  )  ENTIRE RECORD     (  )  OUTSIDE IMMUNIZATIONS                 ( x )  Breast U/S         Please fax records to [facility], Frederick W Dantagnan, MD, [phone]     If you have any questions, please contact collin at [phone].           Patient Name: Stephanie Gonzalez  : 1950  Patient Phone #: [phone]

## 2022-03-29 NOTE — PROGRESS NOTES
Patient, Stephanie Gonzalez (MRN #[id number]), presented with a recorded BMI of 40.63 kg/m^2 consistent with the definition of morbid obesity (ICD-10 E66.01). The patient's morbid obesity was monitored, evaluated, addressed and/or treated. This addendum to the medical record is made on 03/29/2022.

## 2022-05-04 NOTE — TELEPHONE ENCOUNTER
Care Due:                  Date            Visit Type   Department     Provider  --------------------------------------------------------------------------------                                ESTABLISHED                              PATIENT      [facility] PRIMARY  Last Visit: 03-      SHORT        CARE           Ag North                               -                              PRIMARY      [facility]  Next Visit: 09-      CARE ([facility])   CARE           Ag North                                                            Last  Test          Frequency    Reason                     Performed    Due Date  --------------------------------------------------------------------------------    CMP.........  12 months..  lisinopriL...............  10-   10-    [facility] Embedded Care Gaps. Reference number: [id number]. 5/04/2022   2:45:24 PM CDT

## 2022-05-05 NOTE — TELEPHONE ENCOUNTER
Refill Authorization Note   Stephanie Gonzalez  is requesting a refill authorization.  Brief Assessment and Rationale for Refill:  Approve    -Medication-Related Problems Identified: Requires labs  Medication Therapy Plan:  Labs (CMP) overdue, last drawn 10/16/2020    Medication Reconciliation Completed: No   Comments:     Provider Staff:     Action is required for this patient.   Please see care gap opportunities below in Care Due Message.     Thanks!  [facility]     Appointments      Date Provider   Last Visit   3/29/2022 Ag North MD   Next Visit   9/27/2022 Ag North MD     Note composed:10:59 AM 05/05/2022           Note composed:10:59 AM 05/05/2022